# Patient Record
Sex: MALE | Race: WHITE | Employment: OTHER | RURAL
[De-identification: names, ages, dates, MRNs, and addresses within clinical notes are randomized per-mention and may not be internally consistent; named-entity substitution may affect disease eponyms.]

---

## 2017-01-01 ENCOUNTER — OFFICE VISIT (OUTPATIENT)
Dept: FAMILY MEDICINE CLINIC | Age: 82
End: 2017-01-01

## 2017-01-01 VITALS
BODY MASS INDEX: 20.9 KG/M2 | WEIGHT: 146 LBS | TEMPERATURE: 98 F | SYSTOLIC BLOOD PRESSURE: 112 MMHG | OXYGEN SATURATION: 98 % | DIASTOLIC BLOOD PRESSURE: 67 MMHG | HEART RATE: 71 BPM | RESPIRATION RATE: 16 BRPM | HEIGHT: 70 IN

## 2017-01-01 DIAGNOSIS — E55.9 HYPOVITAMINOSIS D: ICD-10-CM

## 2017-01-01 DIAGNOSIS — E78.2 MIXED HYPERLIPIDEMIA: Chronic | ICD-10-CM

## 2017-01-01 DIAGNOSIS — E11.9 CONTROLLED TYPE 2 DIABETES MELLITUS WITHOUT COMPLICATION, WITHOUT LONG-TERM CURRENT USE OF INSULIN (HCC): Chronic | ICD-10-CM

## 2017-01-01 DIAGNOSIS — E83.42 HYPOMAGNESEMIA: ICD-10-CM

## 2017-01-01 DIAGNOSIS — I25.10 CORONARY ARTERY DISEASE INVOLVING NATIVE CORONARY ARTERY OF NATIVE HEART WITHOUT ANGINA PECTORIS: ICD-10-CM

## 2017-01-01 DIAGNOSIS — J00 ACUTE NASOPHARYNGITIS: Primary | ICD-10-CM

## 2017-01-01 RX ORDER — SIMVASTATIN 40 MG/1
TABLET, FILM COATED ORAL
Qty: 90 TAB | Refills: 1 | Status: SHIPPED | OUTPATIENT
Start: 2017-01-01 | End: 2017-01-01 | Stop reason: SDUPTHER

## 2017-01-01 RX ORDER — CALCIUM CARB/VITAMIN D3/VIT K1 500-500-40
1 TABLET,CHEWABLE ORAL DAILY
Qty: 90 CAP | Refills: 1 | Status: SHIPPED | OUTPATIENT
Start: 2017-01-01

## 2017-01-01 RX ORDER — PREDNISONE 20 MG/1
TABLET ORAL
Qty: 30 TAB | Refills: 0 | Status: SHIPPED | OUTPATIENT
Start: 2017-01-01 | End: 2018-01-01 | Stop reason: ALTCHOICE

## 2017-01-01 RX ORDER — IPRATROPIUM BROMIDE 42 UG/1
1 SPRAY, METERED NASAL 4 TIMES DAILY
Qty: 15 ML | Refills: 0 | Status: SHIPPED | OUTPATIENT
Start: 2017-01-01 | End: 2018-01-01 | Stop reason: ALTCHOICE

## 2017-01-01 RX ORDER — METFORMIN HYDROCHLORIDE 500 MG/1
500 TABLET ORAL 2 TIMES DAILY WITH MEALS
Qty: 180 TAB | Refills: 1 | Status: SHIPPED | OUTPATIENT
Start: 2017-01-01 | End: 2017-01-01 | Stop reason: SDUPTHER

## 2017-01-01 RX ORDER — CALCIUM CARB/VITAMIN D3/VIT K1 500-500-40
1 TABLET,CHEWABLE ORAL DAILY
Qty: 90 CAP | Refills: 1 | Status: SHIPPED | OUTPATIENT
Start: 2017-01-01 | End: 2017-01-01 | Stop reason: SDUPTHER

## 2017-01-01 RX ORDER — ZINC GLUCONATE 10 MG
250 LOZENGE ORAL DAILY
Qty: 90 TAB | Refills: 1 | Status: SHIPPED | OUTPATIENT
Start: 2017-01-01

## 2017-01-01 RX ORDER — METFORMIN HYDROCHLORIDE 500 MG/1
500 TABLET ORAL 2 TIMES DAILY WITH MEALS
Qty: 180 TAB | Refills: 1 | Status: SHIPPED | OUTPATIENT
Start: 2017-01-01 | End: 2018-01-01 | Stop reason: SDUPTHER

## 2017-01-01 RX ORDER — SIMVASTATIN 40 MG/1
TABLET, FILM COATED ORAL
Qty: 90 TAB | Refills: 1 | Status: SHIPPED | OUTPATIENT
Start: 2017-01-01 | End: 2018-01-01 | Stop reason: SDUPTHER

## 2017-01-01 RX ORDER — ZINC GLUCONATE 10 MG
250 LOZENGE ORAL DAILY
Qty: 90 TAB | Refills: 1 | Status: SHIPPED | OUTPATIENT
Start: 2017-01-01 | End: 2017-01-01 | Stop reason: SDUPTHER

## 2017-02-03 ENCOUNTER — OFFICE VISIT (OUTPATIENT)
Dept: FAMILY MEDICINE CLINIC | Age: 82
End: 2017-02-03

## 2017-02-03 VITALS
TEMPERATURE: 97.8 F | RESPIRATION RATE: 18 BRPM | HEIGHT: 72 IN | WEIGHT: 154.2 LBS | BODY MASS INDEX: 20.88 KG/M2 | SYSTOLIC BLOOD PRESSURE: 117 MMHG | HEART RATE: 73 BPM | OXYGEN SATURATION: 96 % | DIASTOLIC BLOOD PRESSURE: 85 MMHG

## 2017-02-03 DIAGNOSIS — E55.9 HYPOVITAMINOSIS D: ICD-10-CM

## 2017-02-03 DIAGNOSIS — Z95.1 S/P CABG X 5: ICD-10-CM

## 2017-02-03 DIAGNOSIS — M16.0 PRIMARY OSTEOARTHRITIS OF BOTH HIPS: Chronic | ICD-10-CM

## 2017-02-03 DIAGNOSIS — E83.42 HYPOMAGNESEMIA: ICD-10-CM

## 2017-02-03 DIAGNOSIS — Z13.39 SCREENING FOR ALCOHOLISM: ICD-10-CM

## 2017-02-03 DIAGNOSIS — E78.2 MIXED HYPERLIPIDEMIA: Chronic | ICD-10-CM

## 2017-02-03 DIAGNOSIS — I25.10 CORONARY ARTERY DISEASE INVOLVING NATIVE CORONARY ARTERY OF NATIVE HEART WITHOUT ANGINA PECTORIS: ICD-10-CM

## 2017-02-03 DIAGNOSIS — E11.9 CONTROLLED TYPE 2 DIABETES MELLITUS WITHOUT COMPLICATION, WITHOUT LONG-TERM CURRENT USE OF INSULIN (HCC): Chronic | ICD-10-CM

## 2017-02-03 DIAGNOSIS — Z00.00 ROUTINE GENERAL MEDICAL EXAMINATION AT A HEALTH CARE FACILITY: Primary | ICD-10-CM

## 2017-02-03 RX ORDER — CALCIUM CARB/VITAMIN D3/VIT K1 500-500-40
1 TABLET,CHEWABLE ORAL DAILY
Qty: 90 CAP | Refills: 1 | Status: SHIPPED | OUTPATIENT
Start: 2017-02-03 | End: 2017-06-13 | Stop reason: SDUPTHER

## 2017-02-03 RX ORDER — SIMVASTATIN 40 MG/1
TABLET, FILM COATED ORAL
Qty: 90 TAB | Refills: 1 | Status: SHIPPED | OUTPATIENT
Start: 2017-02-03 | End: 2017-06-13 | Stop reason: SDUPTHER

## 2017-02-03 RX ORDER — ZINC GLUCONATE 10 MG
250 LOZENGE ORAL DAILY
Qty: 90 TAB | Refills: 1 | Status: SHIPPED | OUTPATIENT
Start: 2017-02-03 | End: 2017-01-01 | Stop reason: SDUPTHER

## 2017-02-03 NOTE — PROGRESS NOTES
This is an Initial Medicare Annual Wellness Exam (AWV) (Performed 12 months after IPPE or effective date of Medicare Part B enrollment, Once in a lifetime)    I have reviewed the patient's medical history in detail and updated the computerized patient record. History   The patient comes in today to be evaluated for his diabetes which has been well controlled and he is having no polyuria or polyphagia or polydipsia or abnormal weight loss. The patient has actinic keratoses for which she is seen by dermatologist.  He does have dry skin which is controlled nicely with Lac-Hydrin 12%. The patient had osteoarthritis of his hips but has had hip replacements in the distant past and currently has no pain there. His hyperlipidemia is being treated with simvastatin without any muscle pain or weakness and his hypomagnesemia is being replaced by daily supplements as well as his hypovitaminosis D being treated by vitamin D supplementation with weakness of bone pain. Past Medical History   Diagnosis Date    Arthritis      hips    CAD (coronary artery disease) 2008     quintuple bypass-Savage    Calculus of kidney     Cancer (HCC)      skin cancers (removed)    Diabetes ([facility] [address] 1990's     Type II    GERD (gastroesophageal reflux disease)     Hypercholesterolemia     Other ill-defined conditions(799.89)      elevated cholesterol      Past Surgical History   Procedure Laterality Date    Pr cardiac surg procedure unlist  2008     quintuple bypass-Wofgang    Hx urological  1965     kidney stone extraction    Hx orthopaedic  1996 & 2009     hip replacement- bilateral    Hx cholecystectomy      Colonoscopy       Current Outpatient Prescriptions   Medication Sig Dispense Refill    simvastatin (ZOCOR) 40 mg tablet TAKE 1 TABLET EVERY NIGHT 90 Tab 1    cholecalciferol, vitamin d3, (VITAMIN D3) 400 unit cap Take 400 Units by mouth daily. 90 Cap 1    magnesium 250 mg tab Take 1 Tab by mouth daily.  90 Tab 1    metFORMIN (GLUCOPHAGE) 500 mg tablet Take 1 Tab by mouth two (2) times daily (with meals). 180 Tab 1    OTHER       Lancets misc Use to check blood sugars on monitor 1 Package 2    calcium 500 mg Tab Take  by mouth.  ammonium lactate (LAC-HYDRIN) 12 % lotion Apply 5 g to affected area two (2) times a day for 90 days. rub in to affected area well 450 g 3     Allergies   Allergen Reactions    Aspirin Other (comments)     \"makes stomach raw\"     Family History   Problem Relation Age of Onset    Diabetes Mother     Heart Disease Father     Heart Disease Brother      Social History   Substance Use Topics    Smoking status: Former Smoker    Smokeless tobacco: Never Used      Comment: quit smoking 1954    Alcohol use No     Patient Active Problem List   Diagnosis Code    Diabetes mellitus type 2, controlled ([facility] [address] E11.9    Osteoarthritis of hip M16.9    Hyperlipidemia E78.5    CAD (coronary artery disease) I25.10    SCC (squamous cell carcinoma) C44.92    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    History of cholecystectomy Z90.49    Actinic keratoses L57.0    Dry skin dermatitis L85.3    Controlled type 2 diabetes mellitus without complication, without long-term current use of insulin (HCC) E11.9    Hypomagnesemia E83.42    Hypovitaminosis D E55.9         Depression Risk Factor Screening:     PHQ 2 / 9, over the last two weeks 9/1/2015   Little interest or pleasure in doing things Not at all   Feeling down, depressed or hopeless Not at all   Total Score PHQ 2 0     Alcohol Risk Factor Screening: On any occasion during the past 3 months, have you had more than 4 drinks containing alcohol? No    Do you average more than 14 drinks per week? No    Functional Ability and Level of Safety:     Functional Ability:   Does the patient exhibit a steady gait? yes   How long did it take the patient to get up and walk from a sitting position?  3 seconds   Is the patient self reliant?  (ie can do own laundry, meals, household chores)  yes     Does the patient handle his/her own medications? yes     Does the patient handle his/her own money? yes     Is the patients home safe (ie good lighting, handrails on stairs and bath, etc.)? yes     Did you notice or did patient express any hearing difficulties? yes     Did you notice or did patient express any vision difficulties?   no     Were distance and reading eye charts used? no       Advance Care Planning:   Patient was offered the opportunity to discuss advance care planning:  yes     Does patient have an Advance Directive:  no   If no, did you provide information on Caring Connections? No pt states daughter is going to take care of getting this. ADL Assessment 2/3/2017   Feeding yourself No Help Needed   Getting from bed to chair No Help Needed   Getting dressed No Help Needed   Bathing or showering No Help Needed   Walk across the room (includes cane/walker) No Help Needed   Using the telphone No Help Needed   Taking your medications No Help Needed   Preparing meals No Help Needed   Managing money (expenses/bills) No Help Needed   Moderately strenuous housework (laundry) No Help Needed   Shopping for personal items (toiletries/medicines) No Help Needed   Shopping for groceries No Help Needed   Driving No Help Needed   Climbing a flight of stairs No Help Needed   Getting to places beyond walking distances No Help Needed           Hearing Loss   borderline normal-to-mild    Activities of Daily Living   Self-care. Requires assistance with: no ADLs    Fall Risk     Fall Risk Assessment, last 12 mths 2/3/2017   Able to walk? Yes   Fall in past 12 months? No   Number of falls in past 12 months -     Abuse Screen   Patient is not abused    Review of Systems   A comprehensive review of systems was negative except for that written in the HPI.     Physical Examination     No exam data present    Evaluation of Cognitive Function:  Mood/affect:  happy  Appearance: age appropriate  Family member/caregiver input: NA    Clock exam given and passed. Visit Vitals    /85 (BP 1 Location: Left arm, BP Patient Position: Sitting)    Pulse 73    Temp 97.8 °F (36.6 °C) (Oral)    Resp 18    Ht 6' (1.829 m)    Wt 154 lb 3.2 oz (69.9 kg)    SpO2 96%    BMI 20.91 kg/m2     General:  Alert, cooperative, no distress, appears stated age. Head:  Normocephalic, without obvious abnormality, atraumatic. Eyes:  Conjunctivae/corneas clear. PERRL, EOMs intact. Fundi benign   Ears:  Normal TMs and external ear canals both ears. Nose: Nares normal. Septum midline. Mucosa normal. No drainage or sinus tenderness. Throat: Lips, mucosa, and tongue normal. Teeth and gums normal.   Neck: Supple, symmetrical, trachea midline, no adenopathy, thyroid: no enlargement/tenderness/nodules, no carotid bruit and no JVD. Back:   Symmetric, no curvature. ROM normal. No CVA tenderness. Lungs:   Clear to auscultation bilaterally. Chest wall:  No tenderness or deformity. Patient has a CABG scar. Heart:  Regular rate and rhythm, S1, S2 normal, no murmur, click, rub or gallop. Abdomen:   Soft, non-tender. Bowel sounds normal. No masses,  No organomegaly. Extremities: Extremities normal, atraumatic, no cyanosis or edema. Pulses: 2+ and symmetric all extremities. Skin: Skin color, texture, turgor normal. No rashes or lesions   Lymph nodes: Cervical, supraclavicular, and axillary nodes normal.   Neurologic: CNII-XII intact. Normal strength, sensation and reflexes throughout.    Diabetic foot examPt with wnl DM foot exam including neuro and circ w/o skin nor orthopedic deformities    Patient Care Team:  William Yusuf MD as PCP - General (Family Practice)    Advice/Referrals/Counseling   Education and counseling provided:  Are appropriate based on today's review and evaluation  End-of-Life planning (with patient's consent)  Pneumococcal Vaccine  Influenza Vaccine  Diabetes screening test    Assessment/Plan       ICD-10-CM ICD-9-CM    1. Controlled type 2 diabetes mellitus without complication, without long-term current use of insulin (HCC) E11.9 250.00  DIABETES FOOT EXAM      HEMOGLOBIN A1C WITH EAG      MD COLLECTION VENOUS BLOOD,VENIPUNCTURE      MICROALBUMIN, UR, RAND      LIPID PANEL      METABOLIC PANEL, COMPREHENSIVE      simvastatin (ZOCOR) 40 mg tablet   2. Primary osteoarthritis of both hips X85.8 655.47 METABOLIC PANEL, COMPREHENSIVE   3. Mixed hyperlipidemia E78.2 272.2 LIPID PANEL      METABOLIC PANEL, COMPREHENSIVE      simvastatin (ZOCOR) 40 mg tablet   4. Coronary artery disease involving native coronary artery of native heart without angina pectoris I25.10 414.01 LIPID PANEL      METABOLIC PANEL, COMPREHENSIVE      simvastatin (ZOCOR) 40 mg tablet   5. 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 Hypomagnesemia E83.42 275.2 magnesium 250 mg tab   7. Hypovitaminosis D E55.9 268.9 cholecalciferol, vitamin d3, (VITAMIN D3) 400 unit cap   8. Routine general medical examination at a health care facility Z00.00 V70.0    9. Screening for alcoholism Z13.89 V79.1    .

## 2017-02-03 NOTE — MR AVS SNAPSHOT
Visit Information Date & Time Provider Department Dept. Phone Encounter #  
 2/3/2017  1:20 PM Deepali High MD [facility] [phone] [id number] Upcoming Health Maintenance Date Due  
 FOOT EXAM Q1 10/31/1943 EYE EXAM RETINAL OR DILATED Q1 10[birth year]31/1943 ZOSTER VACCINE AGE 60> 10/31/1993 GLAUCOMA SCREENING Q2Y 10/31/1998 HEMOGLOBIN A1C Q6M 6/11/2016 MICROALBUMIN Q1 9/1/2016 LIPID PANEL Q1 9/1/2016 Pneumococcal 65+ Low/Medium Risk (2 of 2 - PPSV23) 2/3/2018 MEDICARE YEARLY EXAM 2/4/2018 DTaP/Tdap/Td series (2 - Td) 2/3/2027 Allergies as of 2/3/2017  Review Complete On: 2/3/2017 By: Kate Mclain LPN Severity Noted Reaction Type Reactions Aspirin Medium 01/05/2012   Side Effect Other (comments) \"makes stomach raw\" Current Immunizations  Never Reviewed No immunizations on file. Not reviewed this visit You Were Diagnosed With   
  
 Codes Comments Controlled type 2 diabetes mellitus without complication, without long-term current use of insulin ([facility] [address]    -  Primary ICD-10-CM: E11.9 ICD-9-CM: 250.00 Primary osteoarthritis of both hips     ICD-10-CM: M16.0 ICD-9-CM: 715.15 Mixed hyperlipidemia     ICD-10-CM: E78.2 ICD-9-CM: 272.2 Coronary artery disease involving native coronary artery of native heart without angina pectoris     ICD-10-CM: I25.10 ICD-9-CM: 414.01 S/P CABG x 5     ICD-10-CM: Z95.1 ICD-9-CM: V45.81 Hypomagnesemia     ICD-10-CM: B09.37 
ICD-9-CM: 275.2 Hypovitaminosis D     ICD-10-CM: E55.9 ICD-9-CM: 268.9 Routine general medical examination at a health care facility     ICD-10-CM: Z00.00 ICD-9-CM: V70.0 Screening for alcoholism     ICD-10-CM: Z13.89 ICD-9-CM: V79.1 Vitals BP Pulse Temp Resp Height(growth percentile) Weight(growth percentile)  117/85 (BP 1 Location: Left arm, BP Patient Position: Sitting) 73 97.8 °F (36.6 °C) (Oral) 18 6' (1.829 m) 154 lb 3.2 oz (69.9 kg) SpO2 BMI Smoking Status 96% 20.91 kg/m2 Former Smoker Vitals History BMI and BSA Data Body Mass Index Body Surface Area  
 20.91 kg/m 2 1.88 m 2 Preferred Pharmacy Pharmacy Name Phone [facility] [place] [facility] [address] [phone] Your Updated Medication List  
  
   
This list is accurate as of: 2/3/17  2:26 PM.  Always use your most recent med list.  
  
  
  
  
 ammonium lactate 12 % lotion Commonly known as:  LAC-HYDRIN Apply 5 g to affected area two (2) times a day for 90 days. rub in to affected area well  
  
 calcium 500 mg Tab Take  by mouth. cholecalciferol (vitamin d3) 400 unit Cap Commonly known as:  VITAMIN D3 Take 400 Units by mouth daily. Lancets Misc Use to check blood sugars on monitor  
  
 magnesium 250 mg Tab Take 1 Tab by mouth daily. metFORMIN 500 mg tablet Commonly known as:  GLUCOPHAGE Take 1 Tab by mouth two (2) times daily (with meals). OTHER  
  
 simvastatin 40 mg tablet Commonly known as:  ZOCOR  
TAKE 1 TABLET EVERY NIGHT  
  
 varicella zoster vacine live 19,400 unit/0.65 mL Susr injection Commonly known as:  varicella-zoster vacine live 1 Vial by SubCUTAneous route once for 1 dose. Prescriptions Printed Refills  
 varicella zoster vacine live (VARICELLA-ZOSTER VACINE LIVE) 19,400 unit/0.65 mL susr injection 0 Si Vial by SubCUTAneous route once for 1 dose. Class: Print Route: SubCUTAneous Prescriptions Sent to Pharmacy Refills  
 simvastatin (ZOCOR) 40 mg tablet 1 Sig: TAKE 1 TABLET EVERY NIGHT Class: Normal  
 Pharmacy: [address] [place] [address], [address] Ph #: [phone]  
 cholecalciferol, vitamin d3, (VITAMIN D3) 400 unit cap 1 Sig: Take 400 Units by mouth daily.   
 Class: Normal  
 Pharmacy: [address] [place] [address], [address] [place] [address] Ph #: [phone] Route: Oral  
 magnesium 250 mg tab 1 Sig: Take 1 Tab by mouth daily. Class: Normal  
 Pharmacy: [address] [place] [address], [address] [place] [address] Ph #: [phone] Route: Oral  
  
We Performed the Following HEMOGLOBIN A1C WITH EAG [31053 CPT(R)] HM DIABETES FOOT EXAM [HM7 Custom] LIPID PANEL [58695 CPT(R)] METABOLIC PANEL, COMPREHENSIVE [46740 CPT(R)] MICROALBUMIN, UR, RAND [id number] CPT(R)] DC COLLECTION VENOUS BLOOD,VENIPUNCTURE I2124636 CPT(R)] Introducing [facility] & HEALTH SERVICES! Radha Rodríguez introduces [employer] patient portal. Now you can access parts of your medical record, email your doctor's office, and request medication refills online. 1. In your internet browser, go to [email or web address] 2. Click on the First Time User? Click Here link in the Sign In box. You will see the New Member Sign Up page. 3. Enter your [employer] Step Access Code exactly as it appears below. You will not need to use this code after youve completed the sign-up process. If you do not sign up before the expiration date, you must request a new code. · [employer] Access Code: [email or web address] Expires: 5/4/2017  2:26 PM 
 
4. Enter the last four digits of your Social Security Number (xxxx) and Date of Birth (mm/dd/yyyy) as indicated and click Submit. You will be taken to the next sign-up page. 5. Create a [employer] ID. This will be your [employer] Step login ID and cannot be changed, so think of one that is secure and easy to remember. 6. Create a [employer] Step password. You can change your password at any time. 7. Enter your Password Reset Question and Answer. This can be used at a later time if you forget your password. 8. Enter your e-mail address. You will receive e-mail notification when new information is available in [address] 9. Click Sign Up. You can now view and download portions of your medical record. 10. Click the Download Summary menu link to download a portable copy of your medical information. If you have questions, please visit the Frequently Asked Questions section of the [employer] website. Remember, [employer] is NOT to be used for urgent needs. For medical emergencies, dial 911. Now available from your iPhone and Android! Please provide this summary of care documentation to your next provider. Your primary care clinician is listed as Rosa Davis. If you have any questions after today's visit, please call [phone].

## 2017-02-04 LAB
ALBUMIN SERPL-MCNC: 4.1 G/DL (ref 3.5–4.7)
ALBUMIN/GLOB SERPL: 1.5 {RATIO} (ref 1.1–2.5)
ALP SERPL-CCNC: 65 IU/L (ref 39–117)
ALT SERPL-CCNC: 13 IU/L (ref 0–44)
AST SERPL-CCNC: 15 IU/L (ref 0–40)
BILIRUB SERPL-MCNC: 0.5 MG/DL (ref 0–1.2)
BUN SERPL-MCNC: 22 MG/DL (ref 8–27)
BUN/CREAT SERPL: 17 (ref 10–22)
CALCIUM SERPL-MCNC: 9.5 MG/DL (ref 8.6–10.2)
CHLORIDE SERPL-SCNC: 103 MMOL/L (ref 96–106)
CHOLEST SERPL-MCNC: 133 MG/DL (ref 100–199)
CO2 SERPL-SCNC: 22 MMOL/L (ref 18–29)
CREAT SERPL-MCNC: 1.32 MG/DL (ref 0.76–1.27)
EST. AVERAGE GLUCOSE BLD GHB EST-MCNC: 134 MG/DL
GLOBULIN SER CALC-MCNC: 2.8 G/DL (ref 1.5–4.5)
GLUCOSE SERPL-MCNC: 190 MG/DL (ref 65–99)
HBA1C MFR BLD: 6.3 % (ref 4.8–5.6)
HDLC SERPL-MCNC: 45 MG/DL
INTERPRETATION, 910389: NORMAL
INTERPRETATION: NORMAL
LDLC SERPL CALC-MCNC: 68 MG/DL (ref 0–99)
MICROALBUMIN UR-MCNC: 4.1 UG/ML
PDF IMAGE, 910387: NORMAL
POTASSIUM SERPL-SCNC: 4.8 MMOL/L (ref 3.5–5.2)
PROT SERPL-MCNC: 6.9 G/DL (ref 6–8.5)
SODIUM SERPL-SCNC: 141 MMOL/L (ref 134–144)
TRIGL SERPL-MCNC: 99 MG/DL (ref 0–149)
VLDLC SERPL CALC-MCNC: 20 MG/DL (ref 5–40)

## 2017-05-10 ENCOUNTER — OFFICE VISIT (OUTPATIENT)
Dept: FAMILY MEDICINE CLINIC | Age: 82
End: 2017-05-10

## 2017-05-10 DIAGNOSIS — S01.81XD FACIAL LACERATION, SUBSEQUENT ENCOUNTER: Primary | ICD-10-CM

## 2017-05-10 NOTE — PROGRESS NOTES
Erika Bansal is a 80 y.o. male who presents with the following:  Chief Complaint   Patient presents with    Skin Problem       Skin Problem   The history is provided by the patient (Patient with a 4 cm laceration to the lateral aspect on the left of his chin which originally had 13 sutures but now has only 9). Pertinent negatives include no chest pain, no abdominal pain, no headaches and no shortness of breath. Allergies   Allergen Reactions    Aspirin Other (comments)     \"makes stomach raw\"       Current Outpatient Prescriptions   Medication Sig    simvastatin (ZOCOR) 40 mg tablet TAKE 1 TABLET EVERY NIGHT    cholecalciferol, vitamin d3, (VITAMIN D3) 400 unit cap Take 400 Units by mouth daily.  magnesium 250 mg tab Take 1 Tab by mouth daily.  metFORMIN (GLUCOPHAGE) 500 mg tablet Take 1 Tab by mouth two (2) times daily (with meals).  ammonium lactate (LAC-HYDRIN) 12 % lotion Apply 5 g to affected area two (2) times a day for 90 days. rub in to affected area well    OTHER     Lancets misc Use to check blood sugars on monitor    calcium 500 mg Tab Take  by mouth. No current facility-administered medications for this visit.         Past Medical History:   Diagnosis Date    Arthritis     hips    CAD (coronary artery disease) 2008    quintuple bypass-Savage    Calculus of kidney     Cancer ([address]     skin cancers (removed)    Diabetes ([address] 1990's    Type II    GERD (gastroesophageal reflux disease)     Hypercholesterolemia     Other ill-defined conditions     elevated cholesterol       Past Surgical History:   Procedure Laterality Date    CARDIAC SURG PROCEDURE UNLIST  2008    quintuple bypass-Wofgang    COLONOSCOPY      HX CHOLECYSTECTOMY      HX ORTHOPAEDIC  1996 & 2009    hip replacement- bilateral    HX UROLOGICAL  1965    kidney stone extraction       Family History   Problem Relation Age of Onset    Diabetes Mother     Heart Disease Father     Heart Disease Brother Social History     Social History    Marital status:      Spouse name: N/A    Number of children: N/A    Years of education: N/A     Social History Main Topics    Smoking status: Former Smoker    Smokeless tobacco: Never Used      Comment: quit smoking 1954    Alcohol use No    Drug use: No    Sexual activity: Not on file     Other Topics Concern    Not on file     Social History Narrative       Review of Systems   Respiratory: Negative for shortness of breath. Cardiovascular: Negative for chest pain. Gastrointestinal: Negative for abdominal pain. Neurological: Negative for headaches. There were no vitals taken for this visit. Physical Exam   Constitutional: He is oriented to person, place, and time and well-developed, well-nourished, and in no distress. HENT:   Head: Normocephalic and atraumatic. Nose: Nose normal.   Mouth/Throat: Oropharynx is clear and moist.   Eyes: Conjunctivae and EOM are normal. Pupils are equal, round, and reactive to light. Neck: Normal range of motion. Neck supple. No JVD present. No tracheal deviation present. No thyromegaly present. Cardiovascular: Normal rate, regular rhythm, normal heart sounds and intact distal pulses. Exam reveals no gallop and no friction rub. No murmur heard. Pulmonary/Chest: Effort normal and breath sounds normal. No respiratory distress. He has no wheezes. He has no rales. He exhibits no tenderness. Abdominal: Soft. Bowel sounds are normal. He exhibits no distension and no mass. There is no tenderness. There is no rebound and no guarding. Musculoskeletal: Normal range of motion. He exhibits no edema or tenderness. Lymphadenopathy:     He has no cervical adenopathy. Neurological: He is alert and oriented to person, place, and time. He has normal reflexes. No cranial nerve deficit. He exhibits normal muscle tone. Gait normal. Coordination normal.   Skin: Skin is warm and dry. No rash noted. No erythema. Laceration to the left side of the chin that has been sutured and appears fairly well-healed. The 9 remaining sutures are removed with suture scissors and tweezers and the patient is warned to be careful not to put any excessive traction on the skin as it will pop open. Psychiatric: Mood, memory, affect and judgment normal.   Vitals reviewed. ICD-10-CM ICD-9-CM    1.  Facial laceration, subsequent encounter S01.81XD V58.89 REMOVAL OF SUTURES     873.40        Orders Placed This Encounter    REMOVAL OF SUTURES    9 sutures are removed- the patient thinks that the remaining sutures came out on their own    Follow-up Disposition: Not on Joe Mueller MD

## 2017-05-10 NOTE — MR AVS SNAPSHOT
Visit Information Date & Time Provider Department Dept. Phone Encounter #  
 5/10/2017 11:00 AM Alondra Funez [facility] [phone] [id number] Upcoming Health Maintenance Date Due  
 EYE EXAM RETINAL OR DILATED Q1 10/31/1943 ZOSTER VACCINE AGE 60> 10/31/1993 GLAUCOMA SCREENING Q2Y 10/31/1998 INFLUENZA AGE 9 TO ADULT 8/1/2017 HEMOGLOBIN A1C Q6M 8/3/2017 Pneumococcal 65+ Low/Medium Risk (2 of 2 - PPSV23) 2/3/2018 FOOT EXAM Q1 2/3/2018 MICROALBUMIN Q1 2/3/2018 LIPID PANEL Q1 2/3/2018 MEDICARE YEARLY EXAM 2/4/2018 DTaP/Tdap/Td series (2 - Td) 2/3/2027 Allergies as of 5/10/2017  Review Complete On: 5/1/2017 By: Tanika Dumont RN Severity Noted Reaction Type Reactions Aspirin Medium 01/05/2012   Side Effect Other (comments) \"makes stomach raw\" Current Immunizations  Never Reviewed No immunizations on file. Not reviewed this visit Vitals Smoking Status Former Smoker Your Updated Medication List  
  
   
This list is accurate as of: 5/10/17 11:55 AM.  Always use your most recent med list.  
  
  
  
  
 ammonium lactate 12 % lotion Commonly known as:  LAC-HYDRIN Apply 5 g to affected area two (2) times a day for 90 days. rub in to affected area well  
  
 calcium 500 mg Tab Take  by mouth. cholecalciferol (vitamin d3) 400 unit Cap Commonly known as:  VITAMIN D3 Take 400 Units by mouth daily. Lancets Misc Use to check blood sugars on monitor  
  
 magnesium 250 mg Tab Take 1 Tab by mouth daily. metFORMIN 500 mg tablet Commonly known as:  GLUCOPHAGE Take 1 Tab by mouth two (2) times daily (with meals). OTHER  
  
 simvastatin 40 mg tablet Commonly known as:  ZOCOR  
TAKE 1 TABLET EVERY NIGHT Introducing [facility] & [facility]!    
 [facility] introduces [employer] patient portal. Now you can access parts of your medical record, email your doctor's office, and request medication refills online. 1. In your internet browser, go to [email or web address] 2. Click on the First Time User? Click Here link in the Sign In box. You will see the New Member Sign Up page. 3. Enter your [employer] Access Code exactly as it appears below. You will not need to use this code after youve completed the sign-up process. If you do not sign up before the expiration date, you must request a new code. · In[employer] Resource Access Code: [email or web address] Expires: 8/8/2017 11:55 AM 
 
4. Enter the last four digits of your Social Security Number (xxxx) and Date of Birth (mm/dd/yyyy) as indicated and click Submit. You will be taken to the next sign-up page. 5. Create a In[employer] Resource ID. This will be your [employer] login ID and cannot be changed, so think of one that is secure and easy to remember. 6. Create a [employer] password. You can change your password at any time. 7. Enter your Password Reset Question and Answer. This can be used at a later time if you forget your password. 8. Enter your e-mail address. You will receive e-mail notification when new information is available in [address] 9. Click Sign Up. You can now view and download portions of your medical record. 10. Click the Download Summary menu link to download a portable copy of your medical information. If you have questions, please visit the Frequently Asked Questions section of the In[employer] Resource website. Remember, In[employer] Resource is NOT to be used for urgent needs. For medical emergencies, dial 911. Now available from your iPhone and Android! Please provide this summary of care documentation to your next provider. Your primary care clinician is listed as Prince Tate. If you have any questions after today's visit, please call [phone].

## 2017-05-22 ENCOUNTER — OFFICE VISIT (OUTPATIENT)
Dept: FAMILY MEDICINE CLINIC | Age: 82
End: 2017-05-22

## 2017-05-22 VITALS
WEIGHT: 151.4 LBS | TEMPERATURE: 98 F | BODY MASS INDEX: 21.67 KG/M2 | HEIGHT: 70 IN | DIASTOLIC BLOOD PRESSURE: 66 MMHG | SYSTOLIC BLOOD PRESSURE: 111 MMHG | RESPIRATION RATE: 18 BRPM | HEART RATE: 77 BPM | OXYGEN SATURATION: 98 %

## 2017-05-22 DIAGNOSIS — Z95.1 S/P CABG X 5: ICD-10-CM

## 2017-05-22 DIAGNOSIS — I25.10 CORONARY ARTERY DISEASE INVOLVING NATIVE CORONARY ARTERY OF NATIVE HEART WITHOUT ANGINA PECTORIS: ICD-10-CM

## 2017-05-22 DIAGNOSIS — Z02.4 ENCOUNTER FOR CDL (COMMERCIAL DRIVING LICENSE) EXAM: Primary | ICD-10-CM

## 2017-05-22 LAB
BILIRUB UR QL STRIP: NEGATIVE
GLUCOSE UR-MCNC: NEGATIVE MG/DL
KETONES P FAST UR STRIP-MCNC: NEGATIVE MG/DL
PH UR STRIP: 7 [PH] (ref 4.6–8)
PROT UR QL STRIP: NEGATIVE MG/DL
SP GR UR STRIP: 1.02 (ref 1–1.03)
UA UROBILINOGEN AMB POC: NORMAL (ref 0.2–1)
URINALYSIS CLARITY POC: CLEAR
URINALYSIS COLOR POC: NORMAL
URINE BLOOD POC: NEGATIVE
URINE LEUKOCYTES POC: NEGATIVE
URINE NITRITES POC: NEGATIVE

## 2017-05-22 NOTE — PROGRESS NOTES
Cynthia Samuels is a 80 y.o. 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 he reports feeling well today with no complaints. *See scanned media for full history and physical exam.    Visit Vitals    /66 (BP 1 Location: Right arm, BP Patient Position: Sitting)    Pulse 77    Temp 98 °F (36.7 °C) (Temporal)    Resp 18    Ht 5' 10\" (1.778 m)    Wt 151 lb 6.4 oz (68.7 kg)    SpO2 98%    BMI 21.72 kg/m2       Results for orders placed or performed in visit on 05/22/17   AMB POC URINALYSIS DIP STICK AUTO W/O MICRO   Result Value Ref Range    Color (UA POC) Dark Yellow     Clarity (UA POC) Clear     Glucose (UA POC) Negative Negative    Bilirubin (UA POC) Negative Negative    Ketones (UA POC) Negative Negative    Specific gravity (UA POC) 1.025 1.001 - 1.035    Blood (UA POC) Negative Negative    pH (UA POC) 7.0 4.6 - 8.0    Protein (UA POC) Negative Negative mg/dL    Urobilinogen (UA POC) 0.2 mg/dL 0.2 - 1    Nitrites (UA POC) Negative Negative    Leukocyte esterase (UA POC) Negative Negative       Holding DOT forms. Pt needs glasses updated, does not pass with vision today. 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 Will try to order for him.        Robyn David PA-C

## 2017-05-23 ENCOUNTER — TELEPHONE (OUTPATIENT)
Dept: FAMILY MEDICINE CLINIC | Age: 82
End: 2017-05-23

## 2017-05-23 NOTE — TELEPHONE ENCOUNTER
Pt calls to ask if his 2-D echo could be scheduled sooner than  June. 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 Please call at [phone].

## 2017-05-23 NOTE — TELEPHONE ENCOUNTER
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

## 2017-06-13 ENCOUNTER — TELEPHONE (OUTPATIENT)
Dept: FAMILY MEDICINE CLINIC | Age: 82
End: 2017-06-13

## 2017-06-13 DIAGNOSIS — E78.2 MIXED HYPERLIPIDEMIA: Chronic | ICD-10-CM

## 2017-06-13 DIAGNOSIS — I25.10 CORONARY ARTERY DISEASE INVOLVING NATIVE CORONARY ARTERY OF NATIVE HEART WITHOUT ANGINA PECTORIS: ICD-10-CM

## 2017-06-13 DIAGNOSIS — E11.9 CONTROLLED TYPE 2 DIABETES MELLITUS WITHOUT COMPLICATION, WITHOUT LONG-TERM CURRENT USE OF INSULIN (HCC): Chronic | ICD-10-CM

## 2017-06-13 DIAGNOSIS — E55.9 HYPOVITAMINOSIS D: ICD-10-CM

## 2017-06-13 RX ORDER — SIMVASTATIN 40 MG/1
TABLET, FILM COATED ORAL
Qty: 90 TAB | Refills: 1 | Status: SHIPPED | OUTPATIENT
Start: 2017-06-13 | End: 2017-01-01 | Stop reason: SDUPTHER

## 2017-06-13 RX ORDER — CALCIUM CARB/VITAMIN D3/VIT K1 500-500-40
1 TABLET,CHEWABLE ORAL DAILY
Qty: 90 CAP | Refills: 1 | Status: SHIPPED | OUTPATIENT
Start: 2017-06-13 | End: 2017-01-01 | Stop reason: SDUPTHER

## 2017-06-13 RX ORDER — METFORMIN HYDROCHLORIDE 500 MG/1
500 TABLET ORAL 2 TIMES DAILY WITH MEALS
Qty: 180 TAB | Refills: 1 | Status: SHIPPED | OUTPATIENT
Start: 2017-06-13 | End: 2017-01-01 | Stop reason: SDUPTHER

## 2017-06-13 NOTE — TELEPHONE ENCOUNTER
----- Message from Lila Guzman sent at 6/9/2017  6:14 PM EDT -----  Regarding: Dr. Corine Ellis,    Pt is returning the practice call. Pt states he need to have the cataract removed from his left eye, of which this may not be done until sometime in July. Best contact number is [phone].     Thanks,  Martha Francois

## 2017-06-15 NOTE — TELEPHONE ENCOUNTER
Spoke with pt.  Explained to him the 45 day pending requirement for his vision and must return in that time in order to complete current DOT, otherwise will need new physical.

## 2017-08-18 ENCOUNTER — OFFICE VISIT (OUTPATIENT)
Dept: FAMILY MEDICINE CLINIC | Age: 82
End: 2017-08-18

## 2017-08-18 VITALS
BODY MASS INDEX: 20.7 KG/M2 | OXYGEN SATURATION: 98 % | RESPIRATION RATE: 18 BRPM | HEART RATE: 82 BPM | WEIGHT: 144.6 LBS | DIASTOLIC BLOOD PRESSURE: 69 MMHG | TEMPERATURE: 95.6 F | HEIGHT: 70 IN | SYSTOLIC BLOOD PRESSURE: 126 MMHG

## 2017-08-18 DIAGNOSIS — E11.9 CONTROLLED TYPE 2 DIABETES MELLITUS WITHOUT COMPLICATION, WITHOUT LONG-TERM CURRENT USE OF INSULIN (HCC): Chronic | ICD-10-CM

## 2017-08-18 DIAGNOSIS — H25.9 AGE-RELATED CATARACT OF LEFT EYE, UNSPECIFIED AGE-RELATED CATARACT TYPE: Primary | ICD-10-CM

## 2017-08-18 DIAGNOSIS — I08.0 MITRAL REGURGITATION AND AORTIC STENOSIS: ICD-10-CM

## 2017-08-18 DIAGNOSIS — E78.2 MIXED HYPERLIPIDEMIA: Chronic | ICD-10-CM

## 2017-08-18 DIAGNOSIS — I25.10 CORONARY ARTERY DISEASE INVOLVING NATIVE CORONARY ARTERY OF NATIVE HEART WITHOUT ANGINA PECTORIS: ICD-10-CM

## 2017-08-18 NOTE — MR AVS SNAPSHOT
Visit Information Date & Time Provider Department Dept. Phone Encounter #  
 8/18/2017  7:40 AM Gorge Torres MD [place] [address] Primary Care [phone] [id number] Upcoming Health Maintenance Date Due  
 EYE EXAM RETINAL OR DILATED Q1 10/31/1943 ZOSTER VACCINE AGE 60> 8/31/1993 GLAUCOMA SCREENING Q2Y 10/31/1998 INFLUENZA AGE 9 TO ADULT 8/1/2017 HEMOGLOBIN A1C Q6M 8/3/2017 Pneumococcal 65+ Low/Medium Risk (2 of 2 - PPSV23) 2/3/2018 FOOT EXAM Q1 2/3/2018 MICROALBUMIN Q1 2/3/2018 LIPID PANEL Q1 2/3/2018 MEDICARE YEARLY EXAM 2/4/2018 DTaP/Tdap/Td series (2 - Td) 2/3/2027 Allergies as of 8/18/2017  Review Complete On: 8/18/2017 By: Gorge Torres MD  
  
 Severity Noted Reaction Type Reactions Aspirin Medium 01/05/2012   Side Effect Other (comments) \"makes stomach raw\" Current Immunizations  Never Reviewed No immunizations on file. Not reviewed this visit You Were Diagnosed With   
  
 Codes Comments Age-related cataract of left eye, unspecified age-related cataract type    -  Primary ICD-10-CM: H25.9 ICD-9-CM: 366.10 Controlled type 2 diabetes mellitus without complication, without long-term current use of insulin ([facility]     ICD-10-CM: E11.9 ICD-9-CM: 250.00 Mixed hyperlipidemia     ICD-10-CM: E78.2 ICD-9-CM: 272.2 Vitals BP Pulse Temp Resp Height(growth percentile) Weight(growth percentile) 126/69 (BP 1 Location: Left arm, BP Patient Position: Sitting) 82 95.6 °F (35.3 °C) (Oral) 18 5' 10\" (1.778 m) 144 lb 9.6 oz (65.6 kg) SpO2 BMI Smoking Status 98% 20.75 kg/m2 Former Smoker Vitals History BMI and BSA Data Body Mass Index Body Surface Area 20.75 kg/m 2 1.8 m 2 Preferred Pharmacy Pharmacy Name Phone [address] [facility] [address] [place] [address] [phone] Your Updated Medication List  
  
   
 This list is accurate as of: 8/18/17  8:18 AM.  Always use your most recent med list.  
  
  
  
  
 ammonium lactate 12 % lotion Commonly known as:  LAC-HYDRIN Apply 5 g to affected area two (2) times a day for 90 days. rub in to affected area well  
  
 calcium 500 mg Tab Take  by mouth. cholecalciferol (vitamin d3) 400 unit Cap Commonly known as:  VITAMIN D3 Take 400 Units by mouth daily. Lancets Misc Use to check blood sugars on monitor  
  
 magnesium 250 mg Tab Take 1 Tab by mouth daily. metFORMIN 500 mg tablet Commonly known as:  GLUCOPHAGE Take 1 Tab by mouth two (2) times daily (with meals). OTHER  
  
 simvastatin 40 mg tablet Commonly known as:  ZOCOR  
TAKE 1 TABLET EVERY NIGHT We Performed the Following  DIABETES FOOT EXAM [HM7 Custom] LIPID PANEL [09896 CPT(R)] METABOLIC PANEL, COMPREHENSIVE [76004 CPT(R)] VT COLLECTION VENOUS BLOOD,VENIPUNCTURE B9110101 CPT(R)] Introducing [facility]! Yahaira Espinoza introduces [employer] patient portal. Now you can access parts of your medical record, email your doctor's office, and request medication refills online. 1. In your internet browser, go to [email or web address] 2. Click on the First Time User? Click Here link in the Sign In box. You will see the New Member Sign Up page. 3. Enter your [employer] Access Code exactly as it appears below. You will not need to use this code after youve completed the sign-up process. If you do not sign up before the expiration date, you must request a new code. · [employer] Access Code: [email or web address] Expires: 11/16/2017  8:18 AM 
 
4. Enter the last four digits of your Social Security Number (xxxx) and Date of Birth (mm/dd/yyyy) as indicated and click Submit. You will be taken to the next sign-up page. 5. Create a [email or web address] ID.  This will be your [employer] login ID and cannot be changed, so think of one that is secure and easy to remember. 6. Create a [employer] password. You can change your password at any time. 7. Enter your Password Reset Question and Answer. This can be used at a later time if you forget your password. 8. Enter your e-mail address. You will receive e-mail notification when new information is available in [address] 9. Click Sign Up. You can now view and download portions of your medical record. 10. Click the Download Summary menu link to download a portable copy of your medical information. If you have questions, please visit the Frequently Asked Questions section of the [employer] website. Remember, [employer] is NOT to be used for urgent needs. For medical emergencies, dial 911. Now available from your iPhone and Android! Please provide this summary of care documentation to your next provider. Your primary care clinician is listed as Mingo Rodriguez. If you have any questions after today's visit, please call [phone].

## 2017-08-18 NOTE — PROGRESS NOTES
Cici Hanna is a 80 y.o. male who presents with the following:  Chief Complaint   Patient presents with    Pre-op Exam     Left cataract       Pre-op Exam   The history is provided by the patient (Patient is in for medical clearance for a left cataract removal and IOL placement patient is having no untoward symptoms at this time). Pertinent negatives include no chest pain, no abdominal pain, no headaches and no shortness of breath. Associated symptoms comments: The patient has stable coronary artery disease and is recently had a cardiac workup which showed mitral regurgitation and aortic stenosis but a 70% ejection fraction in general he is asymptomatic. The patient has diabetes mellitus which is well controlled with a polyuria and he does have actinic keratoses and dry skin dermatitis as well as hyperlipidemia which is well controlled on a statin without muscle pain or weakness. He takes magnesium to correct his hypomagnesemia and vitamin D to creatinine correct his hypovitaminosis D. The patient states he has had a little trouble maintaining his body weight but is having no difficulty with urination or defecation. His main problem is his vision getting blurry in the left eye. .       Allergies   Allergen Reactions    Aspirin Other (comments)     \"makes stomach raw\"       Current Outpatient Prescriptions   Medication Sig    simvastatin (ZOCOR) 40 mg tablet TAKE 1 TABLET EVERY NIGHT    cholecalciferol, vitamin d3, (VITAMIN D3) 400 unit cap Take 400 Units by mouth daily.  metFORMIN (GLUCOPHAGE) 500 mg tablet Take 1 Tab by mouth two (2) times daily (with meals).  magnesium 250 mg tab Take 1 Tab by mouth daily.  OTHER     Lancets misc Use to check blood sugars on monitor    calcium 500 mg Tab Take  by mouth.  ammonium lactate (LAC-HYDRIN) 12 % lotion Apply 5 g to affected area two (2) times a day for 90 days.  rub in to affected area well     No current facility-administered medications for this visit. Past Medical History:   Diagnosis Date    Arthritis     hips    CAD (coronary artery disease) 2008    quintuple bypass-[place]    Calculus of kidney     Cancer ([facility]     skin cancers (removed)    Diabetes ([facility] 1990's    Type II    GERD (gastroesophageal reflux disease)     Hypercholesterolemia     Other ill-defined conditions     elevated cholesterol       Past Surgical History:   Procedure Laterality Date    CARDIAC SURG PROCEDURE UNLIST  2008    quintuple bypass-[place]    COLONOSCOPY      HX CHOLECYSTECTOMY      HX ORTHOPAEDIC  1996 & 2009    hip replacement- bilateral    HX UROLOGICAL  1965    kidney stone extraction       Family History   Problem Relation Age of Onset    Diabetes Mother     Heart Disease Father     Heart Disease Brother        Social History     Social History    Marital status:      Spouse name: N/A    Number of children: N/A    Years of education: N/A     Social History Main Topics    Smoking status: Former Smoker    Smokeless tobacco: Never Used      Comment: quit smoking 1954    Alcohol use No    Drug use: No    Sexual activity: Not Asked     Other Topics Concern    None     Social History Narrative       Review of Systems   Constitutional: Negative for chills, fever, malaise/fatigue and weight loss. HENT: Negative for congestion, hearing loss, sore throat and tinnitus. Eyes: Negative for blurred vision, pain and discharge. Respiratory: Negative for cough, shortness of breath and wheezing. Cardiovascular: Negative for chest pain, palpitations, orthopnea, claudication and leg swelling. Gastrointestinal: Negative for abdominal pain, constipation and heartburn. Genitourinary: Negative for dysuria, frequency and urgency. Musculoskeletal: Negative for falls, joint pain and myalgias. Skin: Negative for itching and rash.    Neurological: Positive for tingling (Patient has occasional tingling in his right hand usually when he wakes up in the morning and it clears rapidly. ). Negative for dizziness, tremors and headaches. Endo/Heme/Allergies: Negative for environmental allergies and polydipsia. Psychiatric/Behavioral: Negative for depression and substance abuse. The patient is not nervous/anxious. Visit Vitals    /69 (BP 1 Location: Left arm, BP Patient Position: Sitting)    Pulse 82    Temp 95.6 °F (35.3 °C) (Oral)    Resp 18    Ht 5' 10\" (1.778 m)    Wt 144 lb 9.6 oz (65.6 kg)    SpO2 98%    BMI 20.75 kg/m2     Physical Exam   Constitutional: He is oriented to person, place, and time and well-developed, well-nourished, and in no distress. HENT:   Head: Normocephalic and atraumatic. Nose: Nose normal.   Mouth/Throat: Oropharynx is clear and moist.   Eyes: Conjunctivae and EOM are normal. Pupils are equal, round, and reactive to light. Neck: Normal range of motion. Neck supple. No JVD present. No tracheal deviation present. No thyromegaly present. Cardiovascular: Normal rate, regular rhythm and intact distal pulses. Exam reveals no gallop and no friction rub. Murmur (Grade 2/6 systolic murmur loudest over the aortic area) heard. Pulmonary/Chest: Effort normal and breath sounds normal. No respiratory distress. He has no wheezes. He has no rales. He exhibits no tenderness. Abdominal: Soft. Bowel sounds are normal. He exhibits no distension and no mass. There is no tenderness. There is no rebound and no guarding. Musculoskeletal: Normal range of motion. He exhibits no edema or tenderness. Lymphadenopathy:     He has no cervical adenopathy. Neurological: He is alert and oriented to person, place, and time. He has normal reflexes. No cranial nerve deficit. He exhibits normal muscle tone. Gait normal. Coordination normal.   Skin: Skin is warm and dry. No rash noted. No erythema. Psychiatric: Mood, memory, affect and judgment normal.   Vitals reviewed.         ICD-10-CM ICD-9-CM    1. Age-related cataract of left eye, unspecified age-related cataract type H25.9 366.10    2. Controlled type 2 diabetes mellitus without complication, without long-term current use of insulin ([facility]) E11.9 250.00 MO COLLECTION VENOUS BLOOD,VENIPUNCTURE      LIPID PANEL      METABOLIC PANEL, COMPREHENSIVE       DIABETES FOOT EXAM      CBC WITH AUTOMATED DIFF   3. Mixed hyperlipidemia E78.2 272.2 MO COLLECTION VENOUS BLOOD,VENIPUNCTURE      LIPID PANEL      METABOLIC PANEL, COMPREHENSIVE      CBC WITH AUTOMATED DIFF   4. Mitral regurgitation and aortic stenosis I08.0 396.2        Orders Placed This Encounter    LIPID PANEL    METABOLIC PANEL, COMPREHENSIVE    CBC WITH AUTOMATED DIFF     DIABETES FOOT EXAM    MO COLLECTION VENOUS BLOOD,VENIPUNCTURE   Have suggested to the patient that he start taking Ensure with each meal as his beverage to help maintain his body weight.     Follow-up Disposition: Not on Natalee Ovalles MD

## 2017-08-19 LAB
ALBUMIN SERPL-MCNC: 4.3 G/DL (ref 3.5–4.7)
ALBUMIN/GLOB SERPL: 1.6 {RATIO} (ref 1.2–2.2)
ALP SERPL-CCNC: 67 IU/L (ref 39–117)
ALT SERPL-CCNC: 10 IU/L (ref 0–44)
AST SERPL-CCNC: 17 IU/L (ref 0–40)
BASOPHILS # BLD AUTO: 0 X10E3/UL (ref 0–0.2)
BASOPHILS NFR BLD AUTO: 1 %
BILIRUB SERPL-MCNC: 0.5 MG/DL (ref 0–1.2)
BUN SERPL-MCNC: 32 MG/DL (ref 8–27)
BUN/CREAT SERPL: 21 (ref 10–24)
CALCIUM SERPL-MCNC: 10 MG/DL (ref 8.6–10.2)
CHLORIDE SERPL-SCNC: 101 MMOL/L (ref 96–106)
CHOLEST SERPL-MCNC: 132 MG/DL (ref 100–199)
CO2 SERPL-SCNC: 24 MMOL/L (ref 18–29)
CREAT SERPL-MCNC: 1.55 MG/DL (ref 0.76–1.27)
EOSINOPHIL # BLD AUTO: 0.1 X10E3/UL (ref 0–0.4)
EOSINOPHIL NFR BLD AUTO: 2 %
ERYTHROCYTE [DISTWIDTH] IN BLOOD BY AUTOMATED COUNT: 13.4 % (ref 12.3–15.4)
EST. AVERAGE GLUCOSE BLD GHB EST-MCNC: 128 MG/DL
GLOBULIN SER CALC-MCNC: 2.7 G/DL (ref 1.5–4.5)
GLUCOSE SERPL-MCNC: 119 MG/DL (ref 65–99)
HBA1C MFR BLD: 6.1 % (ref 4.8–5.6)
HCT VFR BLD AUTO: 38.8 % (ref 37.5–51)
HDLC SERPL-MCNC: 48 MG/DL
HGB BLD-MCNC: 13.5 G/DL (ref 12.6–17.7)
IMM GRANULOCYTES # BLD: 0 X10E3/UL (ref 0–0.1)
IMM GRANULOCYTES NFR BLD: 0 %
INTERPRETATION, 910389: NORMAL
INTERPRETATION: NORMAL
LDLC SERPL CALC-MCNC: 70 MG/DL (ref 0–99)
LYMPHOCYTES # BLD AUTO: 1.6 X10E3/UL (ref 0.7–3.1)
LYMPHOCYTES NFR BLD AUTO: 33 %
MCH RBC QN AUTO: 31.5 PG (ref 26.6–33)
MCHC RBC AUTO-ENTMCNC: 34.8 G/DL (ref 31.5–35.7)
MCV RBC AUTO: 91 FL (ref 79–97)
MONOCYTES # BLD AUTO: 0.7 X10E3/UL (ref 0.1–0.9)
MONOCYTES NFR BLD AUTO: 16 %
NEUTROPHILS # BLD AUTO: 2.2 X10E3/UL (ref 1.4–7)
NEUTROPHILS NFR BLD AUTO: 48 %
PDF IMAGE, 910387: NORMAL
PLATELET # BLD AUTO: 174 X10E3/UL (ref 150–379)
POTASSIUM SERPL-SCNC: 4.3 MMOL/L (ref 3.5–5.2)
PROT SERPL-MCNC: 7 G/DL (ref 6–8.5)
RBC # BLD AUTO: 4.28 X10E6/UL (ref 4.14–5.8)
SODIUM SERPL-SCNC: 141 MMOL/L (ref 134–144)
TRIGL SERPL-MCNC: 71 MG/DL (ref 0–149)
VLDLC SERPL CALC-MCNC: 14 MG/DL (ref 5–40)
WBC # BLD AUTO: 4.7 X10E3/UL (ref 3.4–10.8)

## 2017-12-05 NOTE — MR AVS SNAPSHOT
Visit Information Date & Time Provider Department Dept. Phone Encounter #  
 12/5/2017  9:40 AM Pepito Hanley MD [facility] [phone] [id number] Upcoming Health Maintenance Date Due  
 EYE EXAM RETINAL OR DILATED Q1 10[birth year]31[birth year]1943 GLAUCOMA SCREENING Q2Y 10/31/1998 Pneumococcal 65+ Low/Medium Risk (2 of 2 - PPSV23) 2/3/2018 MICROALBUMIN Q1 2/3/2018 MEDICARE YEARLY EXAM 2/4/2018 HEMOGLOBIN A1C Q6M 2/18/2018 FOOT EXAM Q1 8/18/2018 LIPID PANEL Q1 8/18/2018 DTaP/Tdap/Td series (2 - Td) 2/3/2027 Allergies as of 12/5/2017  Review Complete On: 12/5/2017 By: Pepito Hanley MD  
  
 Severity Noted Reaction Type Reactions Aspirin Medium 01/05/2012   Side Effect Other (comments) \"makes stomach raw\" Current Immunizations  Never Reviewed No immunizations on file. Not reviewed this visit You Were Diagnosed With   
  
 Codes Comments Acute nasopharyngitis    -  Primary ICD-10-CM: Yoanna Edyta ICD-9-CM: 413 Vitals BP Pulse Temp Resp Height(growth percentile) Weight(growth percentile) 112/67 (BP 1 Location: Left arm) 71 98 °F (36.7 °C) 16 5' 10\" (1.778 m) 146 lb (66.2 kg) SpO2 BMI Smoking Status 98% 20.95 kg/m2 Former Smoker BMI and BSA Data Body Mass Index Body Surface Area  
 20.95 kg/m 2 1.81 m 2 Preferred Pharmacy Pharmacy Name Phone [facility] [address] AT [facility] [address] Juan Carrillo [phone] Your Updated Medication List  
  
   
This list is accurate as of: 12/5/17 10:11 AM.  Always use your most recent med list.  
  
  
  
  
 ammonium lactate 12 % lotion Commonly known as:  LAC-HYDRIN Apply 5 g to affected area two (2) times a day for 90 days. rub in to affected area well  
  
 calcium 500 mg Tab Take  by mouth. cholecalciferol (vitamin d3) 400 unit Cap Commonly known as:  VITAMIN D3  
 Take 400 Units by mouth daily. ipratropium 0.06 % nasal spray Commonly known as:  ATROVENT  
1 [place] by Both Nostrils route four (4) times daily. Indications: Rhinorrhea Lancets Misc Use to check blood sugars on monitor  
  
 magnesium 250 mg Tab Take 1 Tab by mouth daily. metFORMIN 500 mg tablet Commonly known as:  GLUCOPHAGE Take 1 Tab by mouth two (2) times daily (with meals). OTHER  
  
 predniSONE 20 mg tablet Commonly known as:  DELTASONE  
5 po every day x 4 days then 4 on day 5, 3 on day 6, 2 on day 7, and 1 on day 8  
  
 simvastatin 40 mg tablet Commonly known as:  ZOCOR  
TAKE 1 TABLET EVERY NIGHT Prescriptions Sent to Pharmacy Refills  
 predniSONE (DELTASONE) 20 mg tablet 0 Si po every day x 4 days then 4 on day 5, 3 on day 6, 2 on day 7, and 1 on day 8 Class: Normal  
 Pharmacy: [facility] [address]  Ph #: [phone]  
 ipratropium (ATROVENT) 0.06 % nasal spray 0 [place] by Both Nostrils route four (4) times daily. Indications: Rhinorrhea Class: Normal  
 Pharmacy: [facility] [address]  Ph #: [phone] Route: Both Nostrils Introducing [facility]! Chrystal Lovett introduces [employer] patient portal. Now you can access parts of your medical record, email your doctor's office, and request medication refills online. 1. In your internet browser, go to [email or web address] ([email or web address])/[email or web address] 2. Click on the First Time User? Click Here link in the Sign In box. You will see the New Member Sign Up page. 3. Enter your [employer] Access Code exactly as it appears below. You will not need to use this code after youve completed the sign-up process. If you do not sign up before the expiration date, you must request a new code.  
 
· [employer] Access Code: [address] 
 Expires: 3/5/2018 10:11 AM 
 
4. Enter the last four digits of your Social Security Number (xxxx) and Date of Birth (mm/dd/yyyy) as indicated and click Submit. You will be taken to the next sign-up page. 5. Create a [employer] ID. This will be your [employer] login ID and cannot be changed, so think of one that is secure and easy to remember. 6. Create a [employer] password. You can change your password at any time. 7. Enter your Password Reset Question and Answer. This can be used at a later time if you forget your password. 8. Enter your e-mail address. You will receive e-mail notification when new information is available in [address] 9. Click Sign Up. You can now view and download portions of your medical record. 10. Click the Download Summary menu link to download a portable copy of your medical information. If you have questions, please visit the Frequently Asked Questions section of the [employer] website. Remember, [employer] is NOT to be used for urgent needs. For medical emergencies, dial 911. Now available from your iPhone and Android! Please provide this summary of care documentation to your next provider. Your primary care clinician is listed as Marycruz Hadley. If you have any questions after today's visit, please call [phone].

## 2017-12-05 NOTE — PROGRESS NOTES
Yousif Addison is a 80 y.o. male who presents with the following:  Chief Complaint   Patient presents with    Cold Symptoms       Cold Symptoms   The history is provided by the patient (Patient with acute nasopharyngitis for the past 4 days what like something to ease his symptomatic cough. ). Associated symptoms include sore throat. Pertinent negatives include no chest pain, no chills, no ear pain, no headaches, no myalgias, no shortness of breath, no wheezing, no nausea and no vomiting. Allergies   Allergen Reactions    Aspirin Other (comments)     \"makes stomach raw\"       Current Outpatient Prescriptions   Medication Sig    predniSONE (DELTASONE) 20 mg tablet 5 po every day x 4 days then 4 on day 5, 3 on day 6, 2 on day 7, and 1 on day 8    ipratropium (ATROVENT) 0.06 % nasal spray 1 [place] by Both Nostrils route four (4) times daily. Indications: Rhinorrhea    simvastatin (ZOCOR) 40 mg tablet TAKE 1 TABLET EVERY NIGHT    cholecalciferol, vitamin d3, (VITAMIN D3) 400 unit cap Take 400 Units by mouth daily.  magnesium 250 mg tab Take 1 Tab by mouth daily.  metFORMIN (GLUCOPHAGE) 500 mg tablet Take 1 Tab by mouth two (2) times daily (with meals).  OTHER     Lancets misc Use to check blood sugars on monitor    calcium 500 mg Tab Take  by mouth.  ammonium lactate (LAC-HYDRIN) 12 % lotion Apply 5 g to affected area two (2) times a day for 90 days. rub in to affected area well     No current facility-administered medications for this visit.         Past Medical History:   Diagnosis Date    Arthritis     hips    CAD (coronary artery disease) 2008    quintuple bypass-[place]    Calculus of kidney     Cancer (HCC)     skin cancers (removed)    Diabetes ([facility] [address] 1990's    Type II    GERD (gastroesophageal reflux disease)     Hypercholesterolemia     Other ill-defined conditions(799.89)     elevated cholesterol       Past Surgical History:   Procedure Laterality Date    CARDIAC SURG PROCEDURE UNLIST  2008    quintuple bypass-[place]    COLONOSCOPY      HX CHOLECYSTECTOMY      HX ORTHOPAEDIC  1996 & 2009    hip replacement- bilateral    HX UROLOGICAL  1965    kidney stone extraction       Family History   Problem Relation Age of Onset    Diabetes Mother     Heart Disease Father     Heart Disease Brother        Social History     Social History    Marital status:      Spouse name: N/A    Number of children: N/A    Years of education: N/A     Social History Main Topics    Smoking status: Former Smoker    Smokeless tobacco: Never Used      Comment: quit smoking 1954    Alcohol use No    Drug use: No    Sexual activity: Not Asked     Other Topics Concern    None     Social History Narrative       Review of Systems   Constitutional: Positive for malaise/fatigue. Negative for chills and fever. HENT: Positive for congestion and sore throat. Negative for ear discharge, ear pain, hearing loss, nosebleeds and tinnitus. Eyes: Negative for blurred vision, double vision, photophobia and discharge. Respiratory: Positive for cough. Negative for sputum production, shortness of breath, wheezing and stridor. Cardiovascular: Negative for chest pain, palpitations, orthopnea and PND. Gastrointestinal: Negative for abdominal pain, diarrhea, heartburn, nausea and vomiting. Genitourinary: Negative for dysuria, frequency and urgency. Musculoskeletal: Negative for myalgias and neck pain. Skin: Negative for itching and rash. Neurological: Negative for dizziness, tingling and headaches. Endo/Heme/Allergies: Does not bruise/bleed easily. Psychiatric/Behavioral: Negative for depression. The patient has insomnia. The patient is not nervous/anxious.         Visit Vitals    /67 (BP 1 Location: Left arm)    Pulse 71    Temp 98 °F (36.7 °C)    Resp 16    Ht 5' 10\" (1.778 m)    Wt 146 lb (66.2 kg)    SpO2 98%    BMI 20.95 kg/m2     Physical Exam   Constitutional: He is oriented to person, place, and time. He appears distressed. Has coryza that is clear - mild distress   HENT:   Head: Normocephalic and atraumatic. Right Ear: External ear normal.   Left Ear: External ear normal.   Mouth/Throat: No oropharyngeal exudate. Red mm's in the nose and tht   Eyes: Conjunctivae and EOM are normal. Pupils are equal, round, and reactive to light. Right eye exhibits no discharge. Left eye exhibits no discharge. Neck: Normal range of motion. Neck supple. No tracheal deviation present. No thyromegaly present. Cardiovascular: Normal rate, regular rhythm, normal heart sounds and intact distal pulses. Exam reveals no gallop and no friction rub. No murmur heard. Pulmonary/Chest: Effort normal and breath sounds normal. No stridor. No respiratory distress. He has no wheezes. He has no rales. He exhibits no tenderness. Abdominal: He exhibits no distension and no mass. There is no tenderness. There is no guarding. Musculoskeletal: He exhibits no edema or tenderness. Lymphadenopathy:     He has no cervical adenopathy. Neurological: He is alert and oriented to person, place, and time. He has normal reflexes. Gait normal.   Skin: Skin is warm and dry. No rash noted. He is not diaphoretic. No erythema. Psychiatric: Mood, memory, affect and judgment normal.         ICD-10-CM ICD-9-CM    1. Acute nasopharyngitis J00 460 predniSONE (DELTASONE) 20 mg tablet      ipratropium (ATROVENT) 0.06 % nasal spray       Orders Placed This Encounter    predniSONE (DELTASONE) 20 mg tablet     Si po every day x 4 days then 4 on day 5, 3 on day 6, 2 on day 7, and 1 on day 8     Dispense:  30 Tab     Refill:  0    ipratropium (ATROVENT) 0.06 % nasal spray     Si [place] by Both Nostrils route four (4) times daily.  Indications: Rhinorrhea     Dispense:  15 mL     Refill:  0       Follow-up Disposition: Not on Guevara Medley MD

## 2018-01-01 ENCOUNTER — OFFICE VISIT (OUTPATIENT)
Dept: FAMILY MEDICINE CLINIC | Age: 83
End: 2018-01-01

## 2018-01-01 ENCOUNTER — TELEPHONE (OUTPATIENT)
Dept: FAMILY MEDICINE CLINIC | Age: 83
End: 2018-01-01

## 2018-01-01 ENCOUNTER — DOCUMENTATION ONLY (OUTPATIENT)
Dept: FAMILY MEDICINE CLINIC | Age: 83
End: 2018-01-01

## 2018-01-01 ENCOUNTER — TELEPHONE (OUTPATIENT)
Dept: ONCOLOGY | Age: 83
End: 2018-01-01

## 2018-01-01 ENCOUNTER — OFFICE VISIT (OUTPATIENT)
Dept: ONCOLOGY | Age: 83
End: 2018-01-01

## 2018-01-01 VITALS
DIASTOLIC BLOOD PRESSURE: 88 MMHG | RESPIRATION RATE: 18 BRPM | WEIGHT: 136.4 LBS | OXYGEN SATURATION: 98 % | SYSTOLIC BLOOD PRESSURE: 105 MMHG | HEIGHT: 70 IN | TEMPERATURE: 95.6 F | HEART RATE: 94 BPM | BODY MASS INDEX: 19.53 KG/M2

## 2018-01-01 VITALS
RESPIRATION RATE: 18 BRPM | HEIGHT: 73 IN | OXYGEN SATURATION: 97 % | SYSTOLIC BLOOD PRESSURE: 120 MMHG | TEMPERATURE: 97.5 F | DIASTOLIC BLOOD PRESSURE: 87 MMHG | HEART RATE: 57 BPM

## 2018-01-01 VITALS
SYSTOLIC BLOOD PRESSURE: 119 MMHG | OXYGEN SATURATION: 97 % | HEIGHT: 70 IN | WEIGHT: 146 LBS | BODY MASS INDEX: 20.9 KG/M2 | RESPIRATION RATE: 18 BRPM | DIASTOLIC BLOOD PRESSURE: 76 MMHG | HEART RATE: 57 BPM

## 2018-01-01 VITALS
SYSTOLIC BLOOD PRESSURE: 126 MMHG | BODY MASS INDEX: 18.75 KG/M2 | RESPIRATION RATE: 18 BRPM | DIASTOLIC BLOOD PRESSURE: 72 MMHG | OXYGEN SATURATION: 95 % | HEART RATE: 96 BPM | TEMPERATURE: 97.7 F | HEIGHT: 70 IN | WEIGHT: 131 LBS

## 2018-01-01 VITALS
TEMPERATURE: 97.4 F | BODY MASS INDEX: 19.44 KG/M2 | SYSTOLIC BLOOD PRESSURE: 117 MMHG | HEIGHT: 70 IN | DIASTOLIC BLOOD PRESSURE: 71 MMHG | RESPIRATION RATE: 18 BRPM | HEART RATE: 82 BPM | OXYGEN SATURATION: 97 % | WEIGHT: 135.8 LBS

## 2018-01-01 VITALS
TEMPERATURE: 98.2 F | HEART RATE: 74 BPM | HEIGHT: 70 IN | DIASTOLIC BLOOD PRESSURE: 77 MMHG | SYSTOLIC BLOOD PRESSURE: 133 MMHG | WEIGHT: 151.6 LBS | OXYGEN SATURATION: 98 % | RESPIRATION RATE: 18 BRPM | BODY MASS INDEX: 21.7 KG/M2

## 2018-01-01 DIAGNOSIS — L29.9 PRURITIC DERMATITIS: ICD-10-CM

## 2018-01-01 DIAGNOSIS — I25.10 CORONARY ARTERY DISEASE INVOLVING NATIVE CORONARY ARTERY OF NATIVE HEART WITHOUT ANGINA PECTORIS: ICD-10-CM

## 2018-01-01 DIAGNOSIS — E11.9 CONTROLLED TYPE 2 DIABETES MELLITUS WITHOUT COMPLICATION, WITHOUT LONG-TERM CURRENT USE OF INSULIN (HCC): Primary | Chronic | ICD-10-CM

## 2018-01-01 DIAGNOSIS — C83.39 DIFFUSE LARGE B-CELL LYMPHOMA OF SOLID ORGAN EXCLUDING SPLEEN (HCC): Primary | ICD-10-CM

## 2018-01-01 DIAGNOSIS — L85.3 DRY SKIN DERMATITIS: Primary | ICD-10-CM

## 2018-01-01 DIAGNOSIS — R53.82 CHRONIC FATIGUE: ICD-10-CM

## 2018-01-01 DIAGNOSIS — R91.8 LUNG MASS: Primary | ICD-10-CM

## 2018-01-01 DIAGNOSIS — R63.0 POOR APPETITE: ICD-10-CM

## 2018-01-01 DIAGNOSIS — R63.4 ABNORMAL WEIGHT LOSS: ICD-10-CM

## 2018-01-01 DIAGNOSIS — E78.2 MIXED HYPERLIPIDEMIA: Chronic | ICD-10-CM

## 2018-01-01 DIAGNOSIS — R91.8 MASS OF RIGHT LUNG: ICD-10-CM

## 2018-01-01 DIAGNOSIS — R63.4 WEIGHT LOSS: ICD-10-CM

## 2018-01-01 DIAGNOSIS — R53.1 WEAKNESS GENERALIZED: ICD-10-CM

## 2018-01-01 DIAGNOSIS — E11.9 CONTROLLED TYPE 2 DIABETES MELLITUS WITHOUT COMPLICATION, WITHOUT LONG-TERM CURRENT USE OF INSULIN (HCC): Chronic | ICD-10-CM

## 2018-01-01 DIAGNOSIS — H91.90 HEARING LOSS, UNSPECIFIED HEARING LOSS TYPE, UNSPECIFIED LATERALITY: Primary | ICD-10-CM

## 2018-01-01 DIAGNOSIS — R91.1 LUNG NODULE SEEN ON IMAGING STUDY: Primary | ICD-10-CM

## 2018-01-01 DIAGNOSIS — E78.5 DYSLIPIDEMIA: ICD-10-CM

## 2018-01-01 DIAGNOSIS — E55.9 HYPOVITAMINOSIS D: ICD-10-CM

## 2018-01-01 DIAGNOSIS — R63.4 WEIGHT LOSS, UNINTENTIONAL: ICD-10-CM

## 2018-01-01 DIAGNOSIS — R91.1 NODULE OF APEX OF RIGHT LUNG: ICD-10-CM

## 2018-01-01 DIAGNOSIS — R63.0 ANOREXIA: Primary | ICD-10-CM

## 2018-01-01 DIAGNOSIS — R06.2 WHEEZING: ICD-10-CM

## 2018-01-01 DIAGNOSIS — L85.3 XEROSIS OF SKIN: ICD-10-CM

## 2018-01-01 DIAGNOSIS — I08.0 MITRAL REGURGITATION AND AORTIC STENOSIS: ICD-10-CM

## 2018-01-01 DIAGNOSIS — N18.9 CHRONIC KIDNEY DISEASE, UNSPECIFIED CKD STAGE: Primary | ICD-10-CM

## 2018-01-01 DIAGNOSIS — R06.2 WHEEZING: Primary | ICD-10-CM

## 2018-01-01 DIAGNOSIS — R63.0 ANOREXIA: ICD-10-CM

## 2018-01-01 DIAGNOSIS — R13.10 DYSPHAGIA, UNSPECIFIED TYPE: ICD-10-CM

## 2018-01-01 DIAGNOSIS — Z00.00 MEDICARE ANNUAL WELLNESS VISIT, SUBSEQUENT: Primary | ICD-10-CM

## 2018-01-01 LAB
1,25(OH)2D3 SERPL-MCNC: 58.7 PG/ML (ref 19.9–79.3)
ALBUMIN SERPL-MCNC: 4.3 G/DL (ref 3.5–4.7)
ALBUMIN SERPL-MCNC: 4.3 G/DL (ref 3.5–4.7)
ALBUMIN SERPL-MCNC: 4.5 G/DL (ref 3.5–4.7)
ALBUMIN/GLOB SERPL: 1.5 {RATIO} (ref 1.2–2.2)
ALBUMIN/GLOB SERPL: 1.8 {RATIO} (ref 1.2–2.2)
ALBUMIN/GLOB SERPL: 1.9 {RATIO} (ref 1.2–2.2)
ALP SERPL-CCNC: 74 IU/L (ref 39–117)
ALP SERPL-CCNC: 81 IU/L (ref 39–117)
ALP SERPL-CCNC: 83 IU/L (ref 39–117)
ALT SERPL-CCNC: 11 IU/L (ref 0–44)
ALT SERPL-CCNC: 8 IU/L (ref 0–44)
ALT SERPL-CCNC: 9 IU/L (ref 0–44)
AST SERPL-CCNC: 15 IU/L (ref 0–40)
AST SERPL-CCNC: 18 IU/L (ref 0–40)
AST SERPL-CCNC: 20 IU/L (ref 0–40)
BASOPHILS # BLD AUTO: 0 X10E3/UL (ref 0–0.2)
BASOPHILS # BLD AUTO: 0 X10E3/UL (ref 0–0.2)
BASOPHILS NFR BLD AUTO: 0 %
BASOPHILS NFR BLD AUTO: 1 %
BILIRUB SERPL-MCNC: 0.6 MG/DL (ref 0–1.2)
BILIRUB SERPL-MCNC: 0.6 MG/DL (ref 0–1.2)
BILIRUB SERPL-MCNC: 0.7 MG/DL (ref 0–1.2)
BUN SERPL-MCNC: 20 MG/DL (ref 8–27)
BUN SERPL-MCNC: 32 MG/DL (ref 8–27)
BUN SERPL-MCNC: 33 MG/DL (ref 8–27)
BUN/CREAT SERPL: 14 (ref 10–24)
BUN/CREAT SERPL: 17 (ref 10–24)
BUN/CREAT SERPL: 21 (ref 10–24)
CALCIUM SERPL-MCNC: 10.5 MG/DL (ref 8.6–10.2)
CALCIUM SERPL-MCNC: 12.7 MG/DL (ref 8.6–10.2)
CALCIUM SERPL-MCNC: 9.7 MG/DL (ref 8.6–10.2)
CHLORIDE SERPL-SCNC: 92 MMOL/L (ref 96–106)
CHLORIDE SERPL-SCNC: 96 MMOL/L (ref 96–106)
CHLORIDE SERPL-SCNC: 98 MMOL/L (ref 96–106)
CHOLEST SERPL-MCNC: 138 MG/DL (ref 100–199)
CHOLEST SERPL-MCNC: 139 MG/DL (ref 100–199)
CO2 SERPL-SCNC: 22 MMOL/L (ref 18–29)
CO2 SERPL-SCNC: 24 MMOL/L (ref 18–29)
CO2 SERPL-SCNC: 25 MMOL/L (ref 20–29)
CREAT SERPL-MCNC: 1.47 MG/DL (ref 0.76–1.27)
CREAT SERPL-MCNC: 1.56 MG/DL (ref 0.76–1.27)
CREAT SERPL-MCNC: 1.93 MG/DL (ref 0.76–1.27)
EOSINOPHIL # BLD AUTO: 0.1 X10E3/UL (ref 0–0.4)
EOSINOPHIL # BLD AUTO: 0.1 X10E3/UL (ref 0–0.4)
EOSINOPHIL NFR BLD AUTO: 1 %
EOSINOPHIL NFR BLD AUTO: 1 %
ERYTHROCYTE [DISTWIDTH] IN BLOOD BY AUTOMATED COUNT: 13.4 % (ref 12.3–15.4)
ERYTHROCYTE [DISTWIDTH] IN BLOOD BY AUTOMATED COUNT: 13.9 % (ref 12.3–15.4)
EST. AVERAGE GLUCOSE BLD GHB EST-MCNC: 128 MG/DL
EST. AVERAGE GLUCOSE BLD GHB EST-MCNC: 131 MG/DL
GFR SERPLBLD CREATININE-BSD FMLA CKD-EPI: 40 ML/MIN/1.73
GFR SERPLBLD CREATININE-BSD FMLA CKD-EPI: 43 ML/MIN/1.73
GFR SERPLBLD CREATININE-BSD FMLA CKD-EPI: 46 ML/MIN/1.73
GFR SERPLBLD CREATININE-BSD FMLA CKD-EPI: 50 ML/MIN/1.73
GLOBULIN SER CALC-MCNC: 2.4 G/DL (ref 1.5–4.5)
GLOBULIN SER CALC-MCNC: 2.4 G/DL (ref 1.5–4.5)
GLOBULIN SER CALC-MCNC: 2.9 G/DL (ref 1.5–4.5)
GLUCOSE SERPL-MCNC: 136 MG/DL (ref 65–99)
GLUCOSE SERPL-MCNC: 142 MG/DL (ref 65–99)
GLUCOSE SERPL-MCNC: 159 MG/DL (ref 65–99)
HBA1C MFR BLD: 6.1 % (ref 4.8–5.6)
HBA1C MFR BLD: 6.2 % (ref 4.8–5.6)
HCT VFR BLD AUTO: 37.5 % (ref 37.5–51)
HCT VFR BLD AUTO: 37.6 % (ref 37.5–51)
HDLC SERPL-MCNC: 41 MG/DL
HDLC SERPL-MCNC: 41 MG/DL
HGB BLD-MCNC: 12.8 G/DL (ref 13–17.7)
HGB BLD-MCNC: 13.3 G/DL (ref 13–17.7)
IMM GRANULOCYTES # BLD: 0 X10E3/UL (ref 0–0.1)
IMM GRANULOCYTES # BLD: 0 X10E3/UL (ref 0–0.1)
IMM GRANULOCYTES NFR BLD: 0 %
IMM GRANULOCYTES NFR BLD: 0 %
INTERPRETATION, 910389: NORMAL
INTERPRETATION, 910389: NORMAL
INTERPRETATION: NORMAL
LDLC SERPL CALC-MCNC: 73 MG/DL (ref 0–99)
LDLC SERPL CALC-MCNC: 75 MG/DL (ref 0–99)
LYMPHOCYTES # BLD AUTO: 0.9 X10E3/UL (ref 0.7–3.1)
LYMPHOCYTES # BLD AUTO: 1.2 X10E3/UL (ref 0.7–3.1)
LYMPHOCYTES NFR BLD AUTO: 18 %
LYMPHOCYTES NFR BLD AUTO: 21 %
MCH RBC QN AUTO: 30.1 PG (ref 26.6–33)
MCH RBC QN AUTO: 31.4 PG (ref 26.6–33)
MCHC RBC AUTO-ENTMCNC: 34.1 G/DL (ref 31.5–35.7)
MCHC RBC AUTO-ENTMCNC: 35.4 G/DL (ref 31.5–35.7)
MCV RBC AUTO: 88 FL (ref 79–97)
MCV RBC AUTO: 89 FL (ref 79–97)
MICROALBUMIN UR-MCNC: 7.7 UG/ML
MONOCYTES # BLD AUTO: 0.7 X10E3/UL (ref 0.1–0.9)
MONOCYTES # BLD AUTO: 0.9 X10E3/UL (ref 0.1–0.9)
MONOCYTES NFR BLD AUTO: 14 %
MONOCYTES NFR BLD AUTO: 16 %
NEUTROPHILS # BLD AUTO: 3.4 X10E3/UL (ref 1.4–7)
NEUTROPHILS # BLD AUTO: 3.4 X10E3/UL (ref 1.4–7)
NEUTROPHILS NFR BLD AUTO: 61 %
NEUTROPHILS NFR BLD AUTO: 67 %
PDF IMAGE, 910387: NORMAL
PDF IMAGE, 910387: NORMAL
PLATELET # BLD AUTO: 203 X10E3/UL (ref 150–379)
PLATELET # BLD AUTO: 222 X10E3/UL (ref 150–379)
POTASSIUM SERPL-SCNC: 3.9 MMOL/L (ref 3.5–5.2)
POTASSIUM SERPL-SCNC: 4.5 MMOL/L (ref 3.5–5.2)
POTASSIUM SERPL-SCNC: 4.5 MMOL/L (ref 3.5–5.2)
PROT SERPL-MCNC: 6.7 G/DL (ref 6–8.5)
PROT SERPL-MCNC: 6.9 G/DL (ref 6–8.5)
PROT SERPL-MCNC: 7.2 G/DL (ref 6–8.5)
RBC # BLD AUTO: 4.24 X10E6/UL (ref 4.14–5.8)
RBC # BLD AUTO: 4.25 X10E6/UL (ref 4.14–5.8)
SODIUM SERPL-SCNC: 138 MMOL/L (ref 134–144)
SODIUM SERPL-SCNC: 138 MMOL/L (ref 134–144)
SODIUM SERPL-SCNC: 139 MMOL/L (ref 134–144)
TRIGL SERPL-MCNC: 110 MG/DL (ref 0–149)
TRIGL SERPL-MCNC: 126 MG/DL (ref 0–149)
TSH SERPL DL<=0.005 MIU/L-ACNC: 2.94 UIU/ML (ref 0.45–4.5)
TSH SERPL DL<=0.005 MIU/L-ACNC: 3.34 UIU/ML (ref 0.45–4.5)
VLDLC SERPL CALC-MCNC: 22 MG/DL (ref 5–40)
VLDLC SERPL CALC-MCNC: 25 MG/DL (ref 5–40)
WBC # BLD AUTO: 5.1 X10E3/UL (ref 3.4–10.8)
WBC # BLD AUTO: 5.6 X10E3/UL (ref 3.4–10.8)

## 2018-01-01 RX ORDER — METFORMIN HYDROCHLORIDE 500 MG/1
500 TABLET ORAL 2 TIMES DAILY WITH MEALS
Qty: 180 TAB | Refills: 1 | Status: CANCELLED | OUTPATIENT
Start: 2018-01-01

## 2018-01-01 RX ORDER — MEGESTROL ACETATE 40 MG/1
40 TABLET ORAL DAILY
Qty: 30 TAB | Refills: 3 | Status: SHIPPED | OUTPATIENT
Start: 2018-01-01

## 2018-01-01 RX ORDER — ALBUTEROL SULFATE 2.5 MG/.5ML
SOLUTION RESPIRATORY (INHALATION)
COMMUNITY

## 2018-01-01 RX ORDER — SIMVASTATIN 40 MG/1
TABLET, FILM COATED ORAL
Qty: 90 TAB | Refills: 1 | Status: SHIPPED | OUTPATIENT
Start: 2018-01-01 | End: 2018-01-01

## 2018-01-01 RX ORDER — METFORMIN HYDROCHLORIDE 500 MG/1
500 TABLET ORAL 2 TIMES DAILY WITH MEALS
Qty: 180 TAB | Refills: 1 | Status: SHIPPED | OUTPATIENT
Start: 2018-01-01

## 2018-01-01 RX ORDER — FOLIC ACID 1 MG/1
TABLET ORAL DAILY
COMMUNITY

## 2018-01-01 RX ORDER — AMMONIUM LACTATE 12 G/100G
5 LOTION TOPICAL 2 TIMES DAILY
Qty: 450 G | Refills: 3 | Status: SHIPPED | OUTPATIENT
Start: 2018-01-01 | End: 2018-01-01

## 2018-01-01 RX ORDER — ADHESIVE BANDAGE
30 BANDAGE TOPICAL DAILY PRN
COMMUNITY

## 2018-01-01 RX ORDER — CARVEDILOL 3.12 MG/1
TABLET ORAL 2 TIMES DAILY WITH MEALS
COMMUNITY

## 2018-01-01 RX ORDER — PREDNISONE 10 MG/1
TABLET ORAL
Qty: 15 TAB | Refills: 0 | Status: SHIPPED | OUTPATIENT
Start: 2018-01-01 | End: 2018-01-01

## 2018-01-01 RX ORDER — DEXTROSE 40 %
1 GEL (GRAM) ORAL AS NEEDED
COMMUNITY

## 2018-01-01 RX ORDER — PREDNISONE 10 MG/1
TABLET ORAL
Qty: 15 TAB | Refills: 0 | Status: SHIPPED | OUTPATIENT
Start: 2018-01-01 | End: 2018-01-01 | Stop reason: SDUPTHER

## 2018-01-01 RX ORDER — DEXTROMETHORPHAN POLISTIREX 30 MG/5 ML
SUSPENSION, EXTENDED RELEASE 12 HR ORAL
COMMUNITY

## 2018-01-01 RX ORDER — MEGESTROL ACETATE 40 MG/ML
400 SUSPENSION ORAL DAILY
Qty: 150 ML | Refills: 1 | Status: SHIPPED | OUTPATIENT
Start: 2018-01-01 | End: 2018-01-01

## 2018-01-01 RX ORDER — OXYCODONE AND ACETAMINOPHEN 5; 325 MG/1; MG/1
TABLET ORAL
COMMUNITY

## 2018-01-01 RX ORDER — ALBUTEROL SULFATE 90 UG/1
2 AEROSOL, METERED RESPIRATORY (INHALATION)
Qty: 1 INHALER | Refills: 5 | Status: SHIPPED | OUTPATIENT
Start: 2018-01-01 | End: 2018-01-01

## 2018-01-01 RX ORDER — HYDROCORTISONE VALERATE 2 MG/G
CREAM TOPICAL 2 TIMES DAILY
Qty: 15 G | Refills: 1 | Status: SHIPPED | OUTPATIENT
Start: 2018-01-01

## 2018-01-01 RX ORDER — POLYETHYLENE GLYCOL 3350
POWDER (GRAM) MISCELLANEOUS DAILY
COMMUNITY

## 2018-01-01 RX ORDER — IPRATROPIUM BROMIDE 0.5 MG/2.5ML
0.5 SOLUTION RESPIRATORY (INHALATION)
COMMUNITY

## 2018-01-01 RX ORDER — DEXTROSE 50 % IN WATER (D50W) INTRAVENOUS SYRINGE
25 AS NEEDED
COMMUNITY

## 2018-01-01 RX ORDER — ACETAMINOPHEN 325 MG/1
650 TABLET ORAL
COMMUNITY

## 2018-01-01 RX ORDER — ALBUTEROL SULFATE 90 UG/1
2 AEROSOL, METERED RESPIRATORY (INHALATION)
Qty: 1 INHALER | Refills: 5 | Status: SHIPPED | OUTPATIENT
Start: 2018-01-01 | End: 2018-01-01 | Stop reason: SDUPTHER

## 2018-01-01 RX ORDER — FACIAL-BODY WIPES
10 EACH TOPICAL DAILY
COMMUNITY

## 2018-02-06 NOTE — MR AVS SNAPSHOT
Krista Repton 
 
 
 [address] [facility] [phone] Patient: Carlos Rob MRN: [id number] :10/31/1933 Visit Information Date & Time Provider Department Dept. Phone Encounter #  
 2018  1:20 PM Kristine Almanza  [address] [place] [phone] [id number] Upcoming Health Maintenance Date Due  
 EYE EXAM RETINAL OR DILATED Q1 10/31/1943 GLAUCOMA SCREENING Q2Y 10/31/1998 MICROALBUMIN Q1 2/3/2018 HEMOGLOBIN A1C Q6M 2018 FOOT EXAM Q1 2018 LIPID PANEL Q1 2018 MEDICARE YEARLY EXAM 2019 DTaP/Tdap/Td series (2 - Td) 2/3/2027 Allergies as of 2018  Review Complete On: 2018 By: Kristine Almanza MD  
  
 Severity Noted Reaction Type Reactions Aspirin Medium 2012   Side Effect Other (comments) \"makes stomach raw\" Current Immunizations  Never Reviewed Name Date Zoster Vaccine, Live 2/10/2017 Not reviewed this visit You Were Diagnosed With   
  
 Codes Comments Medicare annual wellness visit, subsequent    -  Primary ICD-10-CM: Z00.00 ICD-9-CM: V70.0 Controlled type 2 diabetes mellitus without complication, without long-term current use of insulin [facility]     ICD-10-CM: E11.9 ICD-9-CM: 250.00 Mixed hyperlipidemia     ICD-10-CM: E78.2 ICD-9-CM: 272.2 Coronary artery disease involving native coronary artery of native heart without angina pectoris     ICD-10-CM: I25.10 ICD-9-CM: 414.01 Vitals BP Pulse Temp Resp Height(growth percentile) Weight(growth percentile) 133/77 (BP 1 Location: Left arm, BP Patient Position: Sitting) 74 98.2 °F (36.8 °C) (Oral) 18 5' 10\" (1.778 m) 151 lb 9.6 oz (68.8 kg) SpO2 BMI Smoking Status 98% 21.75 kg/m2 Former Smoker Vitals History BMI and BSA Data Body Mass Index Body Surface Area 21.75 kg/m 2 1.84 m 2 Preferred Pharmacy Pharmacy Name Phone [facility], [place] - [address] [facility] [place] [address] [phone] Your Updated Medication List  
  
   
This list is accurate as of: 2/6/18  2:19 PM.  Always use your most recent med list.  
  
  
  
  
 ammonium lactate 12 % lotion Commonly known as:  LAC-HYDRIN Apply 5 g to affected area two (2) times a day for 90 days. rub in to affected area well  
  
 calcium 500 mg Tab Take  by mouth. cholecalciferol (vitamin d3) 400 unit Cap Commonly known as:  VITAMIN D3 Take 400 Units by mouth daily. Lancets Misc Use to check blood sugars on monitor  
  
 magnesium 250 mg Tab Take 1 Tab by mouth daily. metFORMIN 500 mg tablet Commonly known as:  GLUCOPHAGE Take 1 Tab by mouth two (2) times daily (with meals). OTHER  
  
 simvastatin 40 mg tablet Commonly known as:  ZOCOR  
TAKE 1 TABLET EVERY NIGHT We Performed the Following COLLECTION VENOUS BLOOD,VENIPUNCTURE N2578553 CPT(R)] HEMOGLOBIN A1C WITH EAG [34646 CPT(R)] LIPID PANEL [67492 CPT(R)] METABOLIC PANEL, COMPREHENSIVE [13872 CPT(R)] MICROALBUMIN, UR, RAND [id number] CPT(R)] Patient Instructions Medicare Wellness Visit, Male The best way to live healthy is to have a healthy lifestyle by eating a well-balanced diet, exercising regularly, limiting alcohol and stopping smoking. Regular physical exams and screening tests are another way to keep healthy. Preventive exams provided by your health care provider can find health problems before they become diseases or illnesses. Preventive services including immunizations, screening tests, monitoring and exams can help you take care of your own health. All people over age 72 should have a pneumovax  and and a prevnar shot to prevent pneumonia. These are once in a lifetime unless you and your provider decide differently. All people over 65 should have a yearly flu shot and a tetanus vaccine every 10 years. Screening for diabetes mellitus with a blood sugar test should be done every year. Glaucoma is a disease of the eye due to increased ocular pressure that can lead to blindness and it should be done every year by an eye professional. 
 
Cardiovascular screening tests that check for elevated lipids (fatty part of blood) which can lead to heart disease and strokes should be done every 5 years. Colorectal screening that evaluates for blood or polyps in your colon should be done yearly as a stool test or every five years as a flexible sigmoidoscope or every 10 years as a colonoscopy up to age 76. Men up to age 76 may need a screening blood test for prostate cancer at certain intervals, depending on their personal and family history. This decision is between the patient and his provider. If you have been a smoker or had family history of abdominal aortic aneurysms, you and your provider may decide to schedule an ultrasound test of your aorta. Hepatitis C screening is also recommended for anyone born between [birth year] through [address]. A shingles vaccine is also recommended once in a lifetime after age 61. Your Medicare Wellness Exam is recommended annually. Here is a list of your current Health Maintenance items with a due date: 
Health Maintenance Due Topic Date Due [facility] Eye Exam  10/31/1943  Glaucoma Screening   10/31/1998  Albumin Urine Test  02/03/2018  Hemoglobin A1C    02/18/2018 Introducing [facility]! Braxton Powell introduces [employer] patient portal. Now you can access parts of your medical record, email your doctor's office, and request medication refills online. 1. In your internet browser, go to [email or web address] 2. Click on the First Time User? Click Here link in the Sign In box. You will see the New Member Sign Up page. 3. Enter your BeSmart Access Code exactly as it appears below. You will not need to use this code after youve completed the sign-up process. If you do not sign up before the expiration date, you must request a new code. · BeSmart Access Code: [email or web address] Expires: 3/5/2018 10:11 AM 
 
4. Enter the last four digits of your Social Security Number (xxxx) and Date of Birth (mm/dd/yyyy) as indicated and click Submit. You will be taken to the next sign-up page. 5. Create a BeSmart ID. This will be your BeSmart login ID and cannot be changed, so think of one that is secure and easy to remember. 6. Create a BeSmart password. You can change your password at any time. 7. Enter your Password Reset Question and Answer. This can be used at a later time if you forget your password. 8. Enter your e-mail address. You will receive e-mail notification when new information is available in [address] 9. Click Sign Up. You can now view and download portions of your medical record. 10. Click the Download Summary menu link to download a portable copy of your medical information. If you have questions, please visit the Frequently Asked Questions section of the Be[employer] website. Remember, BeSmart is NOT to be used for urgent needs. For medical emergencies, dial 911. Now available from your iPhone and Android! Please provide this summary of care documentation to your next provider. Your primary care clinician is listed as Debe Nissen. If you have any questions after today's visit, please call [phone].

## 2018-02-06 NOTE — PROGRESS NOTES
This is a Subsequent Medicare Annual Wellness Exam (AWV) (Performed 12 months after IPPE or effective date of Medicare Part B enrollment)    I have reviewed the patient's medical history in detail and updated the computerized patient record. History     Quiana Salmeron is a 80 y.o. male who presents with the following:  Chief Complaint   Patient presents with    Annual Wellness Visit    Diabetes    Cholesterol Problem       Cholesterol Problem   The history is provided by the patient (Patient is having no difficulty with his simvastatin and having no muscle pain or weakness. ). Pertinent negatives include no chest pain, no abdominal pain, no headaches and no shortness of breath. Diabetes   The history is provided by the patient (Patient's diabetes is doing well on just metformin with blood sugars around 100 and no chest pain no peripheral neuropathy). Pertinent negatives include no chest pain, no abdominal pain, no headaches and no shortness of breath. Heart Problem    The history is provided by the patient (Patient's coronary artery disease has been silent with no chest pain or shortness of breath or dyspnea on exertion. He is having no trouble from his mitral regurgitation and aortic stenosis at this time. ). Pertinent negatives include no fever, no malaise/fatigue, no chest pain, no claudication, no orthopnea, no abdominal pain, no headaches, no dizziness, no cough and no shortness of breath. Allergies   Allergen Reactions    Aspirin Other (comments)     \"makes stomach raw\"       Current Outpatient Prescriptions   Medication Sig    simvastatin (ZOCOR) 40 mg tablet TAKE 1 TABLET EVERY NIGHT    cholecalciferol, vitamin d3, (VITAMIN D3) 400 unit cap Take 400 Units by mouth daily.  magnesium 250 mg tab Take 1 Tab by mouth daily.  metFORMIN (GLUCOPHAGE) 500 mg tablet Take 1 Tab by mouth two (2) times daily (with meals).     OTHER     Lancets misc Use to check blood sugars on monitor    calcium 500 mg Tab Take  by mouth.  ammonium lactate (LAC-HYDRIN) 12 % lotion Apply 5 g to affected area two (2) times a day for 90 days. rub in to affected area well     No current facility-administered medications for this visit. Past Medical History:   Diagnosis Date    Arthritis     hips    CAD (coronary artery disease) 2008    quintuple bypass-Savage    Calculus of kidney     Cancer ([facility] [address]     skin cancers (removed)    Diabetes ([facility] [address] 1990's    Type II    GERD (gastroesophageal reflux disease)     Hypercholesterolemia     Other ill-defined conditions(799.89)     elevated cholesterol       Past Surgical History:   Procedure Laterality Date    CARDIAC SURG PROCEDURE UNLIST  2008    quintuple bypass-Wofgang    COLONOSCOPY      HX CHOLECYSTECTOMY      HX ORTHOPAEDIC  1996 & 2009    hip replacement- bilateral    HX UROLOGICAL  1965    kidney stone extraction       Family History   Problem Relation Age of Onset    Diabetes Mother     Heart Disease Father     Heart Disease Brother        Social History     Social History    Marital status:      Spouse name: N/A    Number of children: N/A    Years of education: N/A     Social History Main Topics    Smoking status: Former Smoker    Smokeless tobacco: Never Used      Comment: quit smoking 1954    Alcohol use No    Drug use: No    Sexual activity: Not Asked     Other Topics Concern    None     Social History Narrative       Review of Systems   Constitutional: Negative for chills, fever, malaise/fatigue and weight loss. HENT: Negative for congestion, hearing loss, sore throat and tinnitus. Eyes: Negative for blurred vision, pain and discharge. Respiratory: Negative for cough, shortness of breath and wheezing. Cardiovascular: Negative for chest pain, palpitations, orthopnea, claudication and leg swelling. Gastrointestinal: Negative for abdominal pain, constipation and heartburn.    Genitourinary: Negative for dysuria, frequency and urgency. Musculoskeletal: Negative for falls, joint pain and myalgias. Skin: Negative for itching and rash. Neurological: Negative for dizziness, tingling, tremors and headaches. Endo/Heme/Allergies: Negative for environmental allergies and polydipsia. Psychiatric/Behavioral: Negative for depression and substance abuse. The patient is not nervous/anxious. Visit Vitals    /77 (BP 1 Location: Left arm, BP Patient Position: Sitting)    Pulse 74    Temp 98.2 °F (36.8 °C) (Oral)    Resp 18    Ht 5' 10\" (1.778 m)    Wt 151 lb 9.6 oz (68.8 kg)    SpO2 98%    BMI 21.75 kg/m2     Physical Exam   Constitutional: He is oriented to person, place, and time and well-developed, well-nourished, and in no distress. HENT:   Head: Normocephalic and atraumatic. Nose: Nose normal.   Mouth/Throat: Oropharynx is clear and moist.   Eyes: Conjunctivae and EOM are normal. Pupils are equal, round, and reactive to light. Neck: Normal range of motion. Neck supple. No JVD present. No tracheal deviation present. No thyromegaly present. Cardiovascular: Normal rate, regular rhythm, normal heart sounds and intact distal pulses. Exam reveals no gallop and no friction rub. No murmur heard. Pulmonary/Chest: Effort normal and breath sounds normal. No respiratory distress. He has no wheezes. He has no rales. He exhibits no tenderness. Abdominal: Soft. Bowel sounds are normal. He exhibits no distension and no mass. There is no tenderness. There is no rebound and no guarding. Musculoskeletal: Normal range of motion. He exhibits no edema or tenderness. Lymphadenopathy:     He has no cervical adenopathy. Neurological: He is alert and oriented to person, place, and time. He has normal reflexes. No cranial nerve deficit. He exhibits normal muscle tone. Gait normal. Coordination normal.   Skin: Skin is warm and dry. No rash noted. No erythema.    Psychiatric: Mood, memory, affect and judgment normal.   Vitals reviewed. ICD-10-CM ICD-9-CM    1. Medicare annual wellness visit, subsequent Z00.00 V70.0 COLLECTION VENOUS BLOOD,VENIPUNCTURE      METABOLIC PANEL, COMPREHENSIVE      LIPID PANEL   2. Controlled type 2 diabetes mellitus without complication, without long-term current use of insulin (HCC) E11.9 250.00 MICROALBUMIN, UR, RAND      HEMOGLOBIN A1C WITH EAG      COLLECTION VENOUS BLOOD,VENIPUNCTURE      METABOLIC PANEL, COMPREHENSIVE      LIPID PANEL   3. Mixed hyperlipidemia E78.2 272.2 COLLECTION VENOUS BLOOD,VENIPUNCTURE      METABOLIC PANEL, COMPREHENSIVE      LIPID PANEL       Orders Placed This Encounter    MICROALBUMIN, UR, RAND    HEMOGLOBIN A1C WITH EAG    METABOLIC PANEL, COMPREHENSIVE    LIPID PANEL    COLLECTION VENOUS BLOOD,VENIPUNCTURE       Follow-up Disposition: Not on File    SELENE Benavides MD          Past Medical History:   Diagnosis Date    Arthritis     hips    CAD (coronary artery disease) 2008    quintuple bypass-Savage    Calculus of kidney     Cancer ([facility] [address]     skin cancers (removed)    Diabetes ([facility] [address] 1990's    Type II    GERD (gastroesophageal reflux disease)     Hypercholesterolemia     Other ill-defined conditions(799.89)     elevated cholesterol      Past Surgical History:   Procedure Laterality Date    CARDIAC SURG PROCEDURE UNLIST  2008    quintuple bypass-Wofgang    COLONOSCOPY      HX CHOLECYSTECTOMY      HX ORTHOPAEDIC  1996 & 2009    hip replacement- bilateral    HX UROLOGICAL  1965    kidney stone extraction     Current Outpatient Prescriptions   Medication Sig Dispense Refill    simvastatin (ZOCOR) 40 mg tablet TAKE 1 TABLET EVERY NIGHT 90 Tab 1    cholecalciferol, vitamin d3, (VITAMIN D3) 400 unit cap Take 400 Units by mouth daily. 90 Cap 1    magnesium 250 mg tab Take 1 Tab by mouth daily. 90 Tab 1    metFORMIN (GLUCOPHAGE) 500 mg tablet Take 1 Tab by mouth two (2) times daily (with meals).  180 Tab 1    OTHER       Lancets misc Use to check blood sugars on monitor 1 Package 2    calcium 500 mg Tab Take  by mouth.  ammonium lactate (LAC-HYDRIN) 12 % lotion Apply 5 g to affected area two (2) times a day for 90 days. rub in to affected area well 450 g 3     Allergies   Allergen Reactions    Aspirin Other (comments)     \"makes stomach raw\"     Family History   Problem Relation Age of Onset    Diabetes Mother     Heart Disease Father     Heart Disease Brother      Social History   Substance Use Topics    Smoking status: Former Smoker    Smokeless tobacco: Never Used      Comment: quit smoking 1954    Alcohol use No     Patient Active Problem List   Diagnosis Code    Diabetes mellitus type 2, controlled ([facility] [address] E11.9    Hyperlipidemia E78.5    CAD (coronary artery disease) I25.10    SCC (squamous cell carcinoma) C44.92    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    History of cholecystectomy Z90.49    Actinic keratoses L57.0    Dry skin dermatitis L85.3    Controlled type 2 diabetes mellitus without complication, without long-term current use of insulin (HCC) E11.9    Hypomagnesemia E83.42    Hypovitaminosis D E55.9    Mitral regurgitation and aortic stenosis I08.0       Depression Risk Factor Screening:     PHQ over the last two weeks 2/6/2018   PHQ Not Done -   Little interest or pleasure in doing things Not at all   Feeling down, depressed or hopeless Not at all   Total Score PHQ 2 0     Alcohol Risk Factor Screening: You do not drink alcohol or very rarely. Functional Ability and Level of Safety:   Hearing Loss  The patient needs further evaluation. Activities of Daily Living  The home contains: handrails and grab bars  Patient does total self care    Fall Risk  Fall Risk Assessment, last 12 mths 2/6/2018   Able to walk? Yes   Fall in past 12 months?  No   Number of falls in past 12 months -       Abuse Screen  Patient is not abused    Cognitive Screening   Evaluation of Cognitive Function:  Has your family/caregiver stated any concerns about your memory: no  Normal, Clock Drawing test    Patient Care Team   Patient Care Team:  Cecy Alicea MD as PCP - General (Family Practice)    Assessment/Plan   Education and counseling provided:  Are appropriate based on today's review and evaluation  End-of-Life planning (with patient's consent)  Pneumococcal Vaccine  Influenza Vaccine  Screening for glaucoma    Functional Ability:   Does the patient exhibit a steady gait? yes   How long did it take the patient to get up and walk from a sitting position? 3 seconds   Is the patient self reliant?  (ie can do own laundry, meals, household chores)  yes     Does the patient handle his/her own medications? yes     Does the patient handle his/her own money? yes     Is the patients home safe (ie good lighting, handrails on stairs and bath, etc.)? yes     Did you notice or did patient express any hearing difficulties? yes     Did you notice or did patient express any vision difficulties?   no     Were distance and reading eye charts used? no       Advance Care Planning:   Patient was offered the opportunity to discuss advance care planning:  yes     Does patient have an Advance Directive:  no   If no, did you provide information on Caring Connections?   yes     ADL Assessment 2/3/2017   Feeding yourself No Help Needed   Getting from bed to chair No Help Needed   Getting dressed No Help Needed   Bathing or showering No Help Needed   Walk across the room (includes cane/walker) No Help Needed   Using the telphone No Help Needed   Taking your medications No Help Needed   Preparing meals No Help Needed   Managing money (expenses/bills) No Help Needed   Moderately strenuous housework (laundry) No Help Needed   Shopping for personal items (toiletries/medicines) No Help Needed   Shopping for groceries No Help Needed   Driving No Help Needed   Climbing a flight of stairs No Help Needed   Getting to places beyond walking distances No Help Needed           Health Maintenance Due   Topic Date Due    EYE EXAM RETINAL OR DILATED Q1  10/31/1943    GLAUCOMA SCREENING Q2Y  10/31/1998    MICROALBUMIN Q1  02/03/2018    HEMOGLOBIN A1C Q6M  02/18/2018   KENNY Benavides MD

## 2018-02-06 NOTE — PATIENT INSTRUCTIONS

## 2018-04-16 NOTE — TELEPHONE ENCOUNTER
Pt daughter called, pt is needing rx for hearing test sent to Audiology for 4/30 appointment     Fax # [phone]

## 2018-05-09 NOTE — PROGRESS NOTES
Wilver Morales is a 80 y.o. male who presents with the following:  Chief Complaint   Patient presents with    Coronary Artery Disease    Diabetes    Cholesterol Problem    Weight Loss       Diabetes   The history is provided by the patient (Patient is doing well without chest pain or shortness of breath nor any polyuria or polydipsia but he is noticing that he still losing weight slowly and steadily. ). Pertinent negatives include no chest pain, no abdominal pain, no headaches and no shortness of breath. Cholesterol Problem   The history is provided by the patient (And is tolerating his simvastatin without any muscle pain or weakness. ). Pertinent negatives include no chest pain, no abdominal pain, no headaches and no shortness of breath. Weight Loss   The history is provided by the patient (Patient is actually at the same weight that he was in December but he is also complaining of fatigue. He is having no chest pain nor orthopnea and is staying very active. ). Pertinent negatives include no chest pain, no abdominal pain, no headaches and no shortness of breath. Memory Loss    The history is provided by the patient (Patient is having a little more problems with forgetfulness and occasionally he confuses names. ). Associated symptoms include confusion. Pertinent negatives include no somnolence, no seizures, no unresponsiveness, no weakness, no agitation, no delusions, no hallucinations, no self-injury, no violence, no tingling and no numbness. Skin Problem   The history is provided by the patient (Patient continues to have difficulty with dry skin but does state that he ammonium lactate lotion seems to help quite well. Is not getting too much sting from the lotion. ). Pertinent negatives include no chest pain, no abdominal pain, no headaches and no shortness of breath. Heart Problem    The history is provided by the patient (pt w/o problem with chest pain nor GARCIA post CABG).  Pertinent negatives include no numbness, no chest pain, no abdominal pain, no headaches, no weakness, no cough and no shortness of breath. Allergies   Allergen Reactions    Aspirin Other (comments)     \"makes stomach raw\"       Current Outpatient Prescriptions   Medication Sig    simvastatin (ZOCOR) 40 mg tablet TAKE 1 TABLET EVERY NIGHT    metFORMIN (GLUCOPHAGE) 500 mg tablet Take 1 Tab by mouth two (2) times daily (with meals).  ammonium lactate (LAC-HYDRIN) 12 % lotion Apply 5 g to affected area two (2) times a day for 90 days. rub in to affected area well    cholecalciferol, vitamin d3, (VITAMIN D3) 400 unit cap Take 400 Units by mouth daily.  magnesium 250 mg tab Take 1 Tab by mouth daily.  OTHER     Lancets misc Use to check blood sugars on monitor    calcium 500 mg Tab Take  by mouth. No current facility-administered medications for this visit.         Past Medical History:   Diagnosis Date    Arthritis     hips    CAD (coronary artery disease) 2008    quintuple bypass-Savage    Calculus of kidney     Cancer ([facility] [address]     skin cancers (removed)    Diabetes ([facility] [address] 1990's    Type II    GERD (gastroesophageal reflux disease)     Hypercholesterolemia     Other ill-defined conditions(799.89)     elevated cholesterol       Past Surgical History:   Procedure Laterality Date    CARDIAC SURG PROCEDURE UNLIST  2008    quintuple bypass-Wofgang    COLONOSCOPY      HX CHOLECYSTECTOMY      HX ORTHOPAEDIC  1996 & 2009    hip replacement- bilateral    HX UROLOGICAL  1965    kidney stone extraction       Family History   Problem Relation Age of Onset    Diabetes Mother     Heart Disease Father     Heart Disease Brother        Social History     Social History    Marital status:      Spouse name: N/A    Number of children: N/A    Years of education: N/A     Social History Main Topics    Smoking status: Former Smoker    Smokeless tobacco: Never Used      Comment: quit smoking 1954    Alcohol use No    Drug use: No    Sexual activity: Not Asked     Other Topics Concern    None     Social History Narrative       Review of Systems   HENT: Positive for congestion (She states the pollen is giving him fits but he still works outside. ). Respiratory: Negative for cough and shortness of breath. Cardiovascular: Negative for chest pain. Gastrointestinal: Negative for abdominal pain. Neurological: Negative for tingling, seizures, weakness, numbness and headaches. Psychiatric/Behavioral: Positive for confusion and memory loss. Negative for agitation, hallucinations and self-injury. Visit Vitals    /76 (BP 1 Location: Left arm, BP Patient Position: Sitting)    Pulse (!) 57    Resp 18    Ht 5' 10\" (1.778 m)    Wt 146 lb (66.2 kg)    SpO2 97%    BMI 20.95 kg/m2     Physical Exam   Constitutional: He is oriented to person, place, and time and well-developed, well-nourished, and in no distress. HENT:   Head: Normocephalic and atraumatic. Nose: Nose normal.   Mouth/Throat: Oropharynx is clear and moist.   Eyes: Conjunctivae and EOM are normal. Pupils are equal, round, and reactive to light. Neck: Normal range of motion. Neck supple. No JVD present. No tracheal deviation present. No thyromegaly present. Cardiovascular: Normal rate, regular rhythm, normal heart sounds and intact distal pulses. Exam reveals no gallop and no friction rub. No murmur heard. Pulmonary/Chest: Effort normal and breath sounds normal. No respiratory distress. He has no wheezes. He has no rales. He exhibits no tenderness. Abdominal: Soft. Bowel sounds are normal. He exhibits no distension and no mass. There is no tenderness. There is no rebound and no guarding. Musculoskeletal: Normal range of motion. He exhibits no edema or tenderness. Lymphadenopathy:     He has no cervical adenopathy. Neurological: He is alert and oriented to person, place, and time. He has normal reflexes. No cranial nerve deficit.  He exhibits normal muscle tone. Gait normal. Coordination normal.   Skin: Skin is warm and dry. No rash noted. No erythema. Psychiatric: Mood, affect and judgment normal.   Vitals reviewed. ICD-10-CM ICD-9-CM    1. Controlled type 2 diabetes mellitus without complication, without long-term current use of insulin ([facility]) E11.9 250.00 HEMOGLOBIN A1C WITH EAG      METABOLIC PANEL, COMPREHENSIVE      LIPID PANEL      simvastatin (ZOCOR) 40 mg tablet      metFORMIN (GLUCOPHAGE) 500 mg tablet   2. Mixed hyperlipidemia E78.2 272.2 CBC WITH AUTOMATED DIFF      simvastatin (ZOCOR) 40 mg tablet   3. Coronary artery disease involving native coronary artery of native heart without angina pectoris I25.10 414.01 simvastatin (ZOCOR) 40 mg tablet   4. Mitral regurgitation and aortic stenosis I08.0 396.2 CBC WITH AUTOMATED DIFF   5. Abnormal weight loss R63.4 783.21 CBC WITH AUTOMATED DIFF      TSH 3RD GENERATION   6. Hypovitaminosis D E55.9 268.9 VITAMIN D, 1, 25 DIHYDROXY   7. Pruritic dermatitis L29.9 698.9 ammonium lactate (LAC-HYDRIN) 12 % lotion   8. Xerosis of skin L85.3 706.8 ammonium lactate (LAC-HYDRIN) 12 % lotion       Orders Placed This Encounter    HEMOGLOBIN A1C WITH EAG    METABOLIC PANEL, COMPREHENSIVE    LIPID PANEL    VITAMIN D, 1, 25 DIHYDROXY    CBC WITH AUTOMATED DIFF    TSH 3RD GENERATION    simvastatin (ZOCOR) 40 mg tablet     Sig: TAKE 1 TABLET EVERY NIGHT     Dispense:  90 Tab     Refill:  1    metFORMIN (GLUCOPHAGE) 500 mg tablet     Sig: Take 1 Tab by mouth two (2) times daily (with meals). Dispense:  180 Tab     Refill:  1    ammonium lactate (LAC-HYDRIN) 12 % lotion     Sig: Apply 5 g to affected area two (2) times a day for 90 days.  rub in to affected area well     Dispense:  450 g     Refill:  3       Follow-up Disposition: Not on Aimee MD Augie

## 2018-05-09 NOTE — MR AVS SNAPSHOT
[address] [place] [address] [phone] Patient: Alfredo Frazier MRN: [id number] :10/31/1933 Visit Information Date & Time Provider Department Dept. Phone Encounter #  
 2018  1:00 PM Hugh Mclain MD [address] [place] [address] [id number] Upcoming Health Maintenance Date Due  
 EYE EXAM RETINAL OR DILATED Q1 10/31/1943 GLAUCOMA SCREENING Q2Y 10/31/1998 Influenza Age 5 to Adult 2018 HEMOGLOBIN A1C Q6M 2018 FOOT EXAM Q1 2018 MICROALBUMIN Q1 2019 LIPID PANEL Q1 2019 MEDICARE YEARLY EXAM 2019 DTaP/Tdap/Td series (2 - Td) 2/3/2027 Allergies as of 2018  Review Complete On: 2018 By: Prem Harding LPN Severity Noted Reaction Type Reactions Aspirin Medium 2012   Side Effect Other (comments) \"makes stomach raw\" Current Immunizations  Never Reviewed Name Date Zoster Vaccine, Live 2/10/2017 Not reviewed this visit Vitals BP Pulse Resp Height(growth percentile) Weight(growth percentile) SpO2  
 119/76 (BP 1 Location: Left arm, BP Patient Position: Sitting) (!) 57 18 5' 10\" (1.778 m) 146 lb (66.2 kg) 97% BMI Smoking Status 20.95 kg/m2 Former Smoker Vitals History BMI and BSA Data Body Mass Index Body Surface Area  
 20.95 kg/m 2 1.81 m 2 Preferred Pharmacy Pharmacy Name Phone [address] [phone] Your Updated Medication List  
  
   
This list is accurate as of 18  1:29 PM.  Always use your most recent med list.  
  
  
  
  
 ammonium lactate 12 % lotion Commonly known as:  LAC-HYDRIN Apply 5 g to affected area two (2) times a day for 90 days. rub in to affected area well  
  
 calcium 500 mg Tab Take  by mouth. cholecalciferol (vitamin d3) 400 unit Cap Commonly known as:  VITAMIN D3 Take 400 Units by mouth daily. Lancets Misc Use to check blood sugars on monitor  
  
 magnesium 250 mg Tab Take 1 Tab by mouth daily. metFORMIN 500 mg tablet Commonly known as:  GLUCOPHAGE Take 1 Tab by mouth two (2) times daily (with meals). OTHER  
  
 simvastatin 40 mg tablet Commonly known as:  ZOCOR  
TAKE 1 TABLET EVERY NIGHT Introducing [facility] & HEALTH SERVICES! [facility] introduces [employer] patient portal. Now you can access parts of your medical record, email your doctor's office, and request medication refills online. 1. In your internet browser, go to [email or web address] 2. Click on the First Time User? Click Here link in the Sign In box. You will see the New Member Sign Up page. 3. Enter your [employer] Access Code exactly as it appears below. You will not need to use this code after youve completed the sign-up process. If you do not sign up before the expiration date, you must request a new code. · [employer] Access Code: [email or web address] Expires: 7/29/2018  4:16 PM 
 
4. Enter the last four digits of your Social Security Number (xxxx) and Date of Birth (mm/dd/yyyy) as indicated and click Submit. You will be taken to the next sign-up page. 5. Create a [employer] ID. This will be your [employer] login ID and cannot be changed, so think of one that is secure and easy to remember. 6. Create a [employer] password. You can change your password at any time. 7. Enter your Password Reset Question and Answer. This can be used at a later time if you forget your password. 8. Enter your e-mail address. You will receive e-mail notification when new information is available in [address] 9. Click Sign Up. You can now view and download portions of your medical record. 10. Click the Download Summary menu link to download a portable copy of your medical information. If you have questions, please visit the Frequently Asked Questions section of the [employer] website. Remember, [employer] is NOT to be used for urgent needs. For medical emergencies, dial 911. Now available from your iPhone and Android! Please provide this summary of care documentation to your next provider. Your primary care clinician is listed as Corine Cantu. If you have any questions after today's visit, please call [phone].

## 2018-05-09 NOTE — PROGRESS NOTES
1. Have you been to the ER, urgent care clinic since your last visit? Hospitalized since your last visit? No    2. Have you seen or consulted any other health care providers outside of the [facility] since your last visit? Include any pap smears or colon screening.  No

## 2018-07-02 NOTE — PROGRESS NOTES
Radha Botello is a 80 y.o. male who presents with the following:  Chief Complaint   Patient presents with    Wheezing    Fatigue    Skin Problem    Weight Loss     Poor appetite       Wheezing    The history is provided by the patient (Patient complains of wheezing especially when he lays down at night and during the day with his cough seems somewhat wheezy and he feels tight in his chest.  Denies any chest pain. ). Associated symptoms include cough. Pertinent negatives include no chest pain, no fever, no abdominal pain, no dysuria, no coryza, no headaches, no rhinorrhea, no sore throat, no swollen glands and no rash. Fatigue   The history is provided by the patient (Patient is feeling chronically fatigued and just has no energy is associated with decreased appetite and weight loss. ). Pertinent negatives include no chest pain, no abdominal pain, no headaches and no shortness of breath. Skin Problem   The history is provided by the patient (Patient is been having dry skin which is exceedingly itchy which irritates him a great deal and has been helped with ammonium lactate but not the itching. ). Pertinent negatives include no chest pain, no abdominal pain, no headaches and no shortness of breath. Weight Loss   The history is provided by the patient (Patient has been having persisting weight loss because of his decreased appetite and is associated with some of his osteoarthritis symptoms and constipation from lack of activity. ). Pertinent negatives include no chest pain, no abdominal pain, no headaches and no shortness of breath. Allergies   Allergen Reactions    Aspirin Other (comments)     \"makes stomach raw\"       Current Outpatient Prescriptions   Medication Sig    simvastatin (ZOCOR) 40 mg tablet TAKE 1 TABLET EVERY NIGHT    metFORMIN (GLUCOPHAGE) 500 mg tablet Take 1 Tab by mouth two (2) times daily (with meals).     ammonium lactate (LAC-HYDRIN) 12 % lotion Apply 5 g to affected area two (2) times a day for 90 days. rub in to affected area well    cholecalciferol, vitamin d3, (VITAMIN D3) 400 unit cap Take 400 Units by mouth daily.  magnesium 250 mg tab Take 1 Tab by mouth daily.  OTHER     Lancets misc Use to check blood sugars on monitor    calcium 500 mg Tab Take  by mouth. No current facility-administered medications for this visit. Past Medical History:   Diagnosis Date    Arthritis     hips    CAD (coronary artery disease) 2008    quintuple bypass-[place]    Calculus of kidney     Cancer ([facility] [address]     skin cancers (removed)    Diabetes ([facility] [address] [facility] 1990's    Type II    GERD (gastroesophageal reflux disease)     Hypercholesterolemia     Other ill-defined conditions(799.89)     elevated cholesterol       Past Surgical History:   Procedure Laterality Date    CARDIAC SURG PROCEDURE UNLIST  2008    quintuple bypass-[place]    COLONOSCOPY      HX CHOLECYSTECTOMY      HX ORTHOPAEDIC  1996 & 2009    hip replacement- bilateral    HX UROLOGICAL  1965    kidney stone extraction       Family History   Problem Relation Age of Onset    Diabetes Mother     Heart Disease Father     Heart Disease Brother        Social History     Social History    Marital status:      Spouse name: N/A    Number of children: N/A    Years of education: N/A     Social History Main Topics    Smoking status: Former Smoker    Smokeless tobacco: Never Used      Comment: quit smoking 1954    Alcohol use No    Drug use: No    Sexual activity: Not Asked     Other Topics Concern    None     Social History Narrative       Review of Systems   Constitutional: Positive for fatigue and malaise/fatigue. Negative for chills, fever and weight loss. HENT: Negative for congestion, hearing loss, rhinorrhea, sore throat and tinnitus. Eyes: Negative for blurred vision, pain and discharge. Respiratory: Positive for cough and wheezing. Negative for shortness of breath.     Cardiovascular: Negative for chest pain, palpitations, orthopnea, claudication and leg swelling. Patient's dyslipidemia is doing well on the simvastatin without muscle pain or weakness   Gastrointestinal: Positive for constipation. Negative for abdominal pain, blood in stool, heartburn, melena and nausea. Genitourinary: Negative for dysuria, frequency and urgency. Musculoskeletal: Negative for falls, joint pain and myalgias. Skin: Positive for itching. Negative for rash. Neurological: Negative for dizziness, tingling, tremors and headaches. Endo/Heme/Allergies: Negative for environmental allergies and polydipsia. Does not bruise/bleed easily. Psychiatric/Behavioral: Negative for depression and substance abuse. The patient is not nervous/anxious. Visit Vitals    /88 (BP 1 Location: Left arm, BP Patient Position: Sitting)    Pulse 94    Temp 95.6 °F (35.3 °C) (Oral)    Resp 18    Ht 5' 10\" (1.778 m)    Wt 136 lb 6.4 oz (61.9 kg)    SpO2 98%    BMI 19.57 kg/m2     Physical Exam   Constitutional: He is oriented to person, place, and time and well-developed, well-nourished, and in no distress. HENT:   Head: Normocephalic and atraumatic. Nose: Nose normal.   Mouth/Throat: Oropharynx is clear and moist.   Eyes: Conjunctivae and EOM are normal. Pupils are equal, round, and reactive to light. Neck: Normal range of motion. Neck supple. No JVD present. No tracheal deviation present. No thyromegaly present. Cardiovascular: Normal rate, regular rhythm, normal heart sounds and intact distal pulses. Exam reveals no gallop and no friction rub. No murmur heard. Pulmonary/Chest: Effort normal and breath sounds normal. No respiratory distress. He has no wheezes. He has no rales. He exhibits no tenderness. Abdominal: Soft. Bowel sounds are normal. He exhibits no distension and no mass. There is no tenderness. There is no rebound and no guarding. Musculoskeletal: Normal range of motion.  He exhibits no edema or tenderness. Lymphadenopathy:     He has no cervical adenopathy. Neurological: He is alert and oriented to person, place, and time. He has normal reflexes. No cranial nerve deficit. He exhibits normal muscle tone. Gait normal. Coordination normal.   Skin: Skin is warm and dry. No rash noted. No erythema. Patient's skin is excessively dry   Psychiatric: Mood, memory, affect and judgment normal.   Vitals reviewed. ICD-10-CM ICD-9-CM    1. Dry skin dermatitis L85.3 692.89 COLLECTION VENOUS BLOOD,VENIPUNCTURE      METABOLIC PANEL, COMPREHENSIVE      TSH 3RD GENERATION   2. Chronic fatigue R53.82 780.79 COLLECTION VENOUS BLOOD,VENIPUNCTURE      METABOLIC PANEL, COMPREHENSIVE      TSH 3RD GENERATION      CBC WITH AUTOMATED DIFF   3. Weight loss R63.4 783.21 COLLECTION VENOUS BLOOD,VENIPUNCTURE      METABOLIC PANEL, COMPREHENSIVE      TSH 3RD GENERATION      CBC WITH AUTOMATED DIFF   4. Poor appetite R63.0 783.0 COLLECTION VENOUS BLOOD,VENIPUNCTURE      METABOLIC PANEL, COMPREHENSIVE      TSH 3RD GENERATION      CBC WITH AUTOMATED DIFF   5.  Dyslipidemia E78.5 272.4 CBC WITH AUTOMATED DIFF       Orders Placed This Encounter    COLLECTION VENOUS BLOOD,VENIPUNCTURE    METABOLIC PANEL, COMPREHENSIVE    TSH 3RD GENERATION    CBC WITH AUTOMATED DIFF       Follow-up Disposition: Not on Bandar Lopez MD

## 2018-07-02 NOTE — MR AVS SNAPSHOT
[address] [phone] Patient: Suri Vee MRN: [id number] :10/31/1933 Visit Information Date & Time Provider Department Dept. Phone Encounter #  
 2018  3:00 PM Victor Manuel Wright  [address] [facility] [phone] [id number] Your Appointments 2018  1:00 PM  
Follow Up with Victor Manuel Wright MD  
[facility] [address] ([address]) Appt Note: 3 month f/u  
 [address] [facility]  
[phone] [address] [facility] Upcoming Health Maintenance Date Due  
 EYE EXAM RETINAL OR DILATED Q1 [birth year]31[birth year]1943 GLAUCOMA SCREENING Q2Y 10/31/1998 Influenza Age 5 to Adult 2018 FOOT EXAM Q1 2018 HEMOGLOBIN A1C Q6M 2018 MICROALBUMIN Q1 2019 MEDICARE YEARLY EXAM 2019 LIPID PANEL Q1 2019 DTaP/Tdap/Td series (2 - Td) 2/3/2027 Allergies as of 2018  Review Complete On: 2018 By: Victor Manuel Wright MD  
  
 Severity Noted Reaction Type Reactions Aspirin Medium 2012   Side Effect Other (comments) \"makes stomach raw\" Current Immunizations  Never Reviewed Name Date Zoster Vaccine, Live 2/10/2017 Not reviewed this visit You Were Diagnosed With   
  
 Codes Comments Dry skin dermatitis    -  Primary ICD-10-CM: L85.3 ICD-9-CM: 692.89 Chronic fatigue     ICD-10-CM: R53.82 
ICD-9-CM: 780.79 Weight loss     ICD-10-CM: R63.4 ICD-9-CM: 783.21 Poor appetite     ICD-10-CM: R63.0 ICD-9-CM: 826. 0 Dyslipidemia     ICD-10-CM: E78.5 ICD-9-CM: 272.4 Vitals BP Pulse Temp Resp Height(growth percentile) Weight(growth percentile) 105/88 (BP 1 Location: Left arm, BP Patient Position: Sitting) 94 95.6 °F (35.3 °C) (Oral) 18 5' 10\" (1.778 m) 136 lb 6.4 oz (61.9 kg) SpO2 BMI Smoking Status 98% 19.57 kg/m2 Former Smoker Vitals History BMI and BSA Data Body Mass Index Body Surface Area  
 19.57 kg/m 2 1.75 m 2 Preferred Pharmacy Pharmacy Name Phone [facility] [place] [facility] [address] [facility] [address] [place] [address] [phone] Your Updated Medication List  
  
   
This list is accurate as of 7/2/18  4:03 PM.  Always use your most recent med list.  
  
  
  
  
 ammonium lactate 12 % lotion Commonly known as:  LAC-HYDRIN Apply 5 g to affected area two (2) times a day for 90 days. rub in to affected area well  
  
 calcium 500 mg Tab Take  by mouth. cholecalciferol (vitamin d3) 400 unit Cap Commonly known as:  VITAMIN D3 Take 400 Units by mouth daily. Lancets Misc Use to check blood sugars on monitor  
  
 magnesium 250 mg Tab Take 1 Tab by mouth daily. metFORMIN 500 mg tablet Commonly known as:  GLUCOPHAGE Take 1 Tab by mouth two (2) times daily (with meals). OTHER  
  
 simvastatin 40 mg tablet Commonly known as:  ZOCOR  
TAKE 1 TABLET EVERY NIGHT Introducing [facility] & [facility]! [address] [facility] introduces [employer] patient portal. Now you can access parts of your medical record, email your doctor's office, and request medication refills online. 1. In your internet browser, go to [email or web address] 2. Click on the First Time User? Click Here link in the Sign In box. You will see the New Member Sign Up page. 3. Enter your [employer] Access Code exactly as it appears below. You will not need to use this code after youve completed the sign-up process. If you do not sign up before the expiration date, you must request a new code. · [employer] Access Code: [email or web address] Expires: 7/29/2018  4:16 PM 
 
4. Enter the last four digits of your Social Security Number (xxxx) and Date of Birth (mm/dd/yyyy) as indicated and click Submit. You will be taken to the next sign-up page. 5. Create a [employer] ID. This will be your [employer] login ID and cannot be changed, so think of one that is secure and easy to remember. 6. Create a [employer] password. You can change your password at any time. 7. Enter your Password Reset Question and Answer. This can be used at a later time if you forget your password. 8. Enter your e-mail address. You will receive e-mail notification when new information is available in [address] 9. Click Sign Up. You can now view and download portions of your medical record. 10. Click the Download Summary menu link to download a portable copy of your medical information. If you have questions, please visit the Frequently Asked Questions section of the [employer] website. Remember, [employer] is NOT to be used for urgent needs. For medical emergencies, dial 911. Now available from your iPhone and Android! Please provide this summary of care documentation to your next provider. Your primary care clinician is listed as Kulwinder Alonzo. If you have any questions after today's visit, please call [phone].

## 2018-07-02 NOTE — PROGRESS NOTES
1. Have you been to the ER, urgent care clinic since your last visit? Hospitalized since your last visit? No    2. Have you seen or consulted any other health care providers outside of the [address] [place] [address] since your last visit? Include any pap smears or colon screening.  No

## 2018-07-05 NOTE — PROGRESS NOTES
Spoke with patient. Patient aware of results and states understanding at this time. Patient states that he does not see a nephrologist.  We can send to Dr. Freddie Loaiza. What diagnosis would you like for the referral?    Patient was also very OOB on the phone and sounded winded and wheezing. Patient scheduling an appt to be seen.

## 2018-07-06 PROBLEM — E11.21 TYPE 2 DIABETES WITH NEPHROPATHY (HCC): Status: ACTIVE | Noted: 2018-01-01

## 2018-07-06 NOTE — MR AVS SNAPSHOT
[address] [phone] Patient: Dallas Woodward MRN: [id number] :10/31/1933 Visit Information Date & Time Provider Department Dept. Phone Encounter #  
 2018 10:00 AM Nico Overton  [address] [facility] [phone] [id number] Your Appointments 2018  1:00 PM  
Follow Up with Corie Cheng MD  
[facility] [address] [facility] ([address]) Appt Note: 3 month f/u  
 [facility]  
[phone] [facility] Upcoming Health Maintenance Date Due  
 EYE EXAM RETINAL OR DILATED Q1 10[birth year]31[birth year]1943 GLAUCOMA SCREENING Q2Y 10/31/1998 Influenza Age 5 to Adult 2018 FOOT EXAM Q1 2018 HEMOGLOBIN A1C Q6M 2018 MICROALBUMIN Q1 2019 MEDICARE YEARLY EXAM 2019 LIPID PANEL Q1 2019 DTaP/Tdap/Td series (2 - Td) 2/3/2027 Allergies as of 2018  Review Complete On: 2018 By: Corie Cheng MD  
  
 Severity Noted Reaction Type Reactions Aspirin Medium 2012   Side Effect Other (comments) \"makes stomach raw\" Current Immunizations  Never Reviewed Name Date Zoster Vaccine, Live 2/10/2017 Not reviewed this visit You Were Diagnosed With   
  
 Codes Comments Wheezing     ICD-10-CM: R06.2 ICD-9-CM: 786.07 Vitals BP Pulse Temp Resp Height(growth percentile) Weight(growth percentile) 117/71 (BP 1 Location: Left arm, BP Patient Position: Sitting) 82 97.4 °F (36.3 °C) (Oral) 18 5' 10\" (1.778 m) 135 lb 12.8 oz (61.6 kg) SpO2 BMI Smoking Status 97% 19.49 kg/m2 Former Smoker Vitals History BMI and BSA Data Body Mass Index Body Surface Area  
 19.49 kg/m 2 1.74 m 2 Preferred Pharmacy Pharmacy Name Phone [address] [facility] [address] [facility] [phone] Your Updated Medication List  
  
   
This list is accurate as of 7/6/18 10:53 AM.  Always use your most recent med list.  
  
  
  
  
 albuterol 90 mcg/actuation inhaler Commonly known as:  PROVENTIL HFA, VENTOLIN HFA, PROAIR HFA Take 2 Puffs by inhalation every four (4) hours as needed for Wheezing for up to 30 days. Indications: Bronchospastic Pulmonary Disease  
  
 ammonium lactate 12 % lotion Commonly known as:  LAC-HYDRIN Apply 5 g to affected area two (2) times a day for 90 days. rub in to affected area well  
  
 calcium 500 mg Tab Take  by mouth. cholecalciferol (vitamin d3) 400 unit Cap Commonly known as:  VITAMIN D3 Take 400 Units by mouth daily. hydrocortisone valerate 0.2 % topical cream  
Commonly known as:  [employer] Apply  to affected area two (2) times a day. use thin layer Lancets Misc Use to check blood sugars on monitor  
  
 magnesium 250 mg Tab Take 1 Tab by mouth daily. metFORMIN 500 mg tablet Commonly known as:  GLUCOPHAGE Take 1 Tab by mouth two (2) times daily (with meals). OTHER  
  
 predniSONE 10 mg tablet Commonly known as:  Alinda Monalisa Take 5 today then 4 Tuesday and 3 Wednesday and 2 Thursday and one Friday  
  
 simvastatin 40 mg tablet Commonly known as:  ZOCOR  
TAKE 1 TABLET EVERY NIGHT Introducing [facility]! [facility] introduces [employer] patient portal. Now you can access parts of your medical record, email your doctor's office, and request medication refills online. 1. In your internet browser, go to [email or web address] 2. Click on the First Time User? Click Here link in the Sign In box. You will see the New Member Sign Up page. 3. Enter your [employer] Access Code exactly as it appears below.  You will not need to use this code after youve completed the sign-up process. If you do not sign up before the expiration date, you must request a new code. · [employer] Access Code: [email or web address] Expires: 7/29/2018  4:16 PM 
 
4. Enter the last four digits of your Social Security Number (xxxx) and Date of Birth (mm/dd/yyyy) as indicated and click Submit. You will be taken to the next sign-up page. 5. Create a [employer] ID. This will be your [employer] login ID and cannot be changed, so think of one that is secure and easy to remember. 6. Create a [employer] password. You can change your password at any time. 7. Enter your Password Reset Question and Answer. This can be used at a later time if you forget your password. 8. Enter your e-mail address. You will receive e-mail notification when new information is available in [address] 9. Click Sign Up. You can now view and download portions of your medical record. 10. Click the Download Summary menu link to download a portable copy of your medical information. If you have questions, please visit the Frequently Asked Questions section of the [employer] website. Remember, [employer] is NOT to be used for urgent needs. For medical emergencies, dial 911. Now available from your iPhone and Android! Please provide this summary of care documentation to your next provider. Your primary care clinician is listed as Mk García. If you have any questions after today's visit, please call [phone].

## 2018-07-06 NOTE — PROGRESS NOTES
1. Have you been to the ER, urgent care clinic since your last visit? Hospitalized since your last visit? No    2. Have you seen or consulted any other health care providers outside of the [address] [place] [address] since your last visit? Include any pap smears or colon screening.  No

## 2018-07-06 NOTE — PROGRESS NOTES
Subjective:     Chief Complaint   Patient presents with    Wheezing       Tamara Watts is a 80 y.o. male who presents today with c/o cough, wheezing, and no appetite that started about one month ago. Says the cough produces white mucus and is worse at night. No fever or chills. No chest pain or SOB. He was seen for this 4 days ago by his PCP who ordered him some prednisone and an Albuterol inhaler. Pt has not started these medications yet because they were accidentally sent to his mail-order pharmacy. He does have a smoking history but quit in 1950. Has lost about 30 pounds in 3.5 years. Had a CT of the chest done with contrast  in 2013 and a 1 cm nodule was found on the R lung apex. It was recommended that he have a follow-up CT scan in 4-6 months but there is no other CT scan in his chart and he denies receiving healthcare outside of [address] [place] [address]. Would like to do a repeat CT scan now to check on lung nodule but at this point he has some CKD. Last creatinine was 1.93 on 7/2/18. He has been referred to the nephrologist but has not seen her yet. CT with contrast may be contraindicated due to poor kidney function.        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    History of cholecystectomy Z90.49    Actinic keratoses L57.0    Dry skin dermatitis L85.3    Controlled type 2 diabetes mellitus without complication, without long-term current use of insulin (HCC) E11.9    Hypomagnesemia E83.42    Hypovitaminosis D E55.9    Mitral regurgitation and aortic stenosis I08.0    Type 2 diabetes with nephropathy ([address] E11.21       Past Medical History:   Diagnosis Date    Arthritis     hips    CAD (coronary artery disease) 2008    quintuple bypass-Savage    Calculus of kidney     Cancer ([address]     skin cancers (removed)    Diabetes ([address] 1990's    Type II    GERD (gastroesophageal reflux disease)     Hypercholesterolemia     Other ill-defined conditions(799.89)     elevated cholesterol         Current Outpatient Prescriptions:     hydrocortisone valerate (WESTCORT) 0.2 % topical cream, Apply  to affected area two (2) times a day. use thin layer, Disp: 15 g, Rfl: 1    predniSONE (DELTASONE) 10 mg tablet, Take 5 today then 4 Tuesday and 3 Wednesday and 2 Thursday and one Friday, Disp: 15 Tab, Rfl: 0    albuterol (PROVENTIL HFA, VENTOLIN HFA, PROAIR HFA) 90 mcg/actuation inhaler, Take 2 Puffs by inhalation every four (4) hours as needed for Wheezing for up to 30 days. Indications: Bronchospastic Pulmonary Disease, Disp: 1 Inhaler, Rfl: 5    simvastatin (ZOCOR) 40 mg tablet, TAKE 1 TABLET EVERY NIGHT, Disp: 90 Tab, Rfl: 1    metFORMIN (GLUCOPHAGE) 500 mg tablet, Take 1 Tab by mouth two (2) times daily (with meals). , Disp: 180 Tab, Rfl: 1    ammonium lactate (LAC-HYDRIN) 12 % lotion, Apply 5 g to affected area two (2) times a day for 90 days. rub in to affected area well, Disp: 450 g, Rfl: 3    cholecalciferol, vitamin d3, (VITAMIN D3) 400 unit cap, Take 400 Units by mouth daily. , Disp: 90 Cap, Rfl: 1    magnesium 250 mg tab, Take 1 Tab by mouth daily. , Disp: 90 Tab, Rfl: 1    OTHER, , Disp: , Rfl:     Lancets misc, Use to check blood sugars on monitor, Disp: 1 Package, Rfl: 2    calcium 500 mg Tab, Take  by mouth., Disp: , Rfl:     Allergies   Allergen Reactions    Aspirin Other (comments)     \"makes stomach raw\"       Past Surgical History:   Procedure Laterality Date    CARDIAC SURG PROCEDURE UNLIST  2008    quintuple bypass-Wofgang    COLONOSCOPY      HX CHOLECYSTECTOMY      HX ORTHOPAEDIC  1996 & 2009    hip replacement- bilateral    HX UROLOGICAL  1965    kidney stone extraction       History   Smoking Status    Former Smoker   Smokeless Tobacco    Never Used     Comment: quit smoking 1954       Social History     Social History    Marital status:      Spouse name: N/A    Number of children: N/A    Years of education: N/A     Social History Main Topics    Smoking status: Former Smoker    Smokeless tobacco: Never Used      Comment: quit smoking 1954    Alcohol use No    Drug use: No    Sexual activity: Not Asked     Other Topics Concern    None     Social History Narrative       Family History   Problem Relation Age of Onset    Diabetes Mother     Heart Disease Father     Heart Disease Brother        ROS:  Gen: denies fever, chills, or fatigue  HEENT: + mild nasal congestion and runny nose. No headaches or sore throat  Resp: + cough and wheezing. No dyspnea  CV: denies chest pain, pressure, or palpitations  Extremeties: denies edema, pallor, or cyanosis  Neuro: denies dizziness  Skin: denies rashes or new lesions     Objective:     Visit Vitals    /71 (BP 1 Location: Left arm, BP Patient Position: Sitting)    Pulse 82    Temp 97.4 °F (36.3 °C) (Oral)    Resp 18    Ht 5' 10\" (1.778 m)    Wt 135 lb 12.8 oz (61.6 kg)    SpO2 97%    BMI 19.49 kg/m2     Body mass index is 19.49 kg/(m^2). General: Alert and oriented. No acute distress. Very thin. HEENT :  Ears:TMs normal. Canals clear. Eyes: Sclera white, conjunctiva clear. PERRLA. Extra ocular movements intact. Nose: Patent. Nasal mucosa pink, non-edematous, and without drainage. Mouth: Pharynx non-erythematous, without exudate   Neck: Supple with FROM. No lymphadenopathy  Lungs: Breathing even and unlabored. All lobes clear to auscultation bilaterally   Heart :RRR, S1 and S2 normal intensity, no extra heart sounds  Extremities: Non-edematous  Neuro: Cranial nerves grossly normal.  Mental status: Cognition, concentration, memory, and speech intact. Skin: Warm, dry, and intact. No lesions or discoloration.     Results for orders placed or performed in visit on 62/17/09   METABOLIC PANEL, COMPREHENSIVE   Result Value Ref Range    Glucose 142 (H) 65 - 99 mg/dL    BUN 32 (H) 8 - 27 mg/dL    Creatinine 1.93 (H) 0.76 - 1.27 mg/dL    GFR est non-AA 31 (L) >59 mL/min/1.73    GFR est AA 36 (L) >59 mL/min/1.73    BUN/Creatinine ratio 17 10 - 24    Sodium 138 134 - 144 mmol/L    Potassium 3.9 3.5 - 5.2 mmol/L    Chloride 92 (L) 96 - 106 mmol/L    CO2 25 20 - 29 mmol/L    Calcium 12.7 (H) 8.6 - 10.2 mg/dL    Protein, total 6.9 6.0 - 8.5 g/dL    Albumin 4.5 3.5 - 4.7 g/dL    GLOBULIN, TOTAL 2.4 1.5 - 4.5 g/dL    A-G Ratio 1.9 1.2 - 2.2    Bilirubin, total 0.6 0.0 - 1.2 mg/dL    Alk. phosphatase 81 39 - 117 IU/L    AST (SGOT) 18 0 - 40 IU/L    ALT (SGPT) 9 0 - 44 IU/L   TSH 3RD GENERATION   Result Value Ref Range    TSH 3.340 0.450 - 4.500 uIU/mL   CBC WITH AUTOMATED DIFF   Result Value Ref Range    WBC 5.1 3.4 - 10.8 x10E3/uL    RBC 4.25 4.14 - 5.80 x10E6/uL    HGB 12.8 (L) 13.0 - 17.7 g/dL    HCT 37.5 37.5 - 51.0 %    MCV 88 79 - 97 fL    MCH 30.1 26.6 - 33.0 pg    MCHC 34.1 31.5 - 35.7 g/dL    RDW 13.4 12.3 - 15.4 %    PLATELET 445 720 - 530 x10E3/uL    NEUTROPHILS 67 Not Estab. %    Lymphocytes 18 Not Estab. %    MONOCYTES 14 Not Estab. %    EOSINOPHILS 1 Not Estab. %    BASOPHILS 0 Not Estab. %    ABS. NEUTROPHILS 3.4 1.4 - 7.0 x10E3/uL    Abs Lymphocytes 0.9 0.7 - 3.1 x10E3/uL    ABS. MONOCYTES 0.7 0.1 - 0.9 x10E3/uL    ABS. EOSINOPHILS 0.1 0.0 - 0.4 x10E3/uL    ABS. BASOPHILS 0.0 0.0 - 0.2 x10E3/uL    IMMATURE GRANULOCYTES 0 Not Estab. %    ABS. IMM. GRANS. 0.0 0.0 - 0.1 x10E3/uL   CKD REPORT   Result Value Ref Range    Interpretation Note        No results found for this visit on 07/06/18. Assessment/ Plan:     1. Wheezing  Prednisone and Albuterol called into pt's local pharmacy, [facility]. F/U 1 week    2. Abnormal weight loss/ poor appetite  Suspect lung cancer given pt's nodule found on CT scan 5 years ago. Would like to repeat CT scan of the chest with contrast but first want pt to see nephrologist to discuss risks/ benefits.   Will contact Dr Ede Huffman and ask her to please advise. Verbal and written instructions (see AVS) provided.  Patient expresses understanding of diagnosis and treatment plan. Health Maintenance Due   Topic Date Due    EYE EXAM RETINAL OR DILATED Q1  10[birth year]31/1943    GLAUCOMA SCREENING Q2Y  10/31/1998         Follow-up Disposition: Not on File      Georgia Bean NP

## 2018-07-10 NOTE — TELEPHONE ENCOUNTER
Returned daughter's call and explained to her that I wanted to get Dr Jama Covarrubias input before ordering the CT scan because I do not want to cause any further damage to pt's kidneys. She says that she is about to have back surgery and will not be able to accompany her dad to the nephrology appt and she does not want him to see Dr Kathrynn Merlin alone because he will get confused and not remember what was said. She asked me if I could talk to Dr Kathrynn Merlin over the phone without the patient going in for an appt. I told her I would call the doctor tomorrow and get back to her. May have to order CT scan without contrast. She also tells me patient has not had a good appetite in a very long time so I called in an appetite stimulant, Megace to [facility].

## 2018-07-10 NOTE — TELEPHONE ENCOUNTER
Pt daughter Purnima Doss called (on disclosure) and is wanting a call back re: pt last appointment 7/6/18. She states he seems unsure of what he was told and she is stating she's not sure he advised of what was going on. She is stating he has also lost 15-20 lbs in the last 6 weeks.  She can be reached at [phone]

## 2018-07-10 NOTE — TELEPHONE ENCOUNTER
I spoke with Mr. Kush Ceballos daughter and she doesn't think he clearly understood what was going on at office visit.   She wants to know when he can get the CT and if there is anything he can be given to help him with poor appetite

## 2018-07-11 NOTE — TELEPHONE ENCOUNTER
I spoke to pt's daughter on the phone about my conversation with the nephrologist Dr Andrew Morse. She advised not to have contrast with the CT scan due to the risk of worsening kidney function. Will order the CT scan withOUT contrast to r/o lung tumor. Pt's daughter verbalized understanding and I told her I would call her with the results since her father has some dementia.

## 2018-07-16 NOTE — PROGRESS NOTES
1. Have you been to the ER, urgent care clinic since your last visit? Hospitalized since your last visit? No    2. Have you seen or consulted any other health care providers outside of the [address] [place] [address] since your last visit? Include any pap smears or colon screening.  No

## 2018-07-16 NOTE — PROGRESS NOTES
Subjective:     Chief Complaint   Patient presents with    Abnormal CT Scan     discuss results       Leanne Fernandez is a 80 y.o. male, accompanied by his daughter Tia Oshea, who presents today to discuss his recent CT scan of the chest. A 7x7 cm mass was found on the R hilar region with surrounding enlarged lymph nodes and nodules. Suspicious of bronchogenic carcinoma. Pt has had a cough and wheezing with poor appetite and weight loss. He has lost 20 pounds over the past year and has lost 4 pounds in the past 10 days. Was prescribed liquid Megace to stimulate his appetite but he says it is hard to swallow. It is easier for him to eat thickened liquids like pudding or applesauce. Says it feels like food sometimes gets stuck in his throat. 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    History of cholecystectomy Z90.49    Actinic keratoses L57.0    Dry skin dermatitis L85.3    Controlled type 2 diabetes mellitus without complication, without long-term current use of insulin (HCC) E11.9    Hypomagnesemia E83.42    Hypovitaminosis D E55.9    Mitral regurgitation and aortic stenosis I08.0    Type 2 diabetes with nephropathy ([facility] E11.21       Past Medical History:   Diagnosis Date    Arthritis     hips    CAD (coronary artery disease) 2008    quintuple bypass-Savage    Calculus of kidney     Cancer ([facility]     skin cancers (removed)    Diabetes ([facility] 1990's    Type II    GERD (gastroesophageal reflux disease)     Hypercholesterolemia     Other ill-defined conditions(799.89)     elevated cholesterol         Current Outpatient Prescriptions:     megestrol (MEGACE) 400 mg/10 mL (10 mL) suspension, Take 10 mL by mouth daily. , Disp: 150 mL, Rfl: 1    albuterol (PROVENTIL HFA, VENTOLIN HFA, PROAIR HFA) 90 mcg/actuation inhaler, Take 2 Puffs by inhalation every four (4) hours as needed for Wheezing for up to 30 days. Indications: Bronchospastic Pulmonary Disease, Disp: 1 Inhaler, Rfl: 5    predniSONE (DELTASONE) 10 mg tablet, Take 5 today then 4 Tuesday and 3 Wednesday and 2 Thursday and one Friday, Disp: 15 Tab, Rfl: 0    hydrocortisone valerate (WESTCORT) 0.2 % topical cream, Apply  to affected area two (2) times a day. use thin layer, Disp: 15 g, Rfl: 1    simvastatin (ZOCOR) 40 mg tablet, TAKE 1 TABLET EVERY NIGHT, Disp: 90 Tab, Rfl: 1    metFORMIN (GLUCOPHAGE) 500 mg tablet, Take 1 Tab by mouth two (2) times daily (with meals). , Disp: 180 Tab, Rfl: 1    ammonium lactate (LAC-HYDRIN) 12 % lotion, Apply 5 g to affected area two (2) times a day for 90 days. rub in to affected area well, Disp: 450 g, Rfl: 3    cholecalciferol, vitamin d3, (VITAMIN D3) 400 unit cap, Take 400 Units by mouth daily. , Disp: 90 Cap, Rfl: 1    magnesium 250 mg tab, Take 1 Tab by mouth daily. , Disp: 90 Tab, Rfl: 1    OTHER, , Disp: , Rfl:     Lancets misc, Use to check blood sugars on monitor, Disp: 1 Package, Rfl: 2    calcium 500 mg Tab, Take  by mouth., Disp: , Rfl:     Allergies   Allergen Reactions    Aspirin Other (comments)     \"makes stomach raw\"       Past Surgical History:   Procedure Laterality Date    CARDIAC SURG PROCEDURE UNLIST  2008    quintuple bypass-Wofgang    COLONOSCOPY      HX CHOLECYSTECTOMY      HX ORTHOPAEDIC  1996 & 2009    hip replacement- bilateral    HX UROLOGICAL  1965    kidney stone extraction       History   Smoking Status    Former Smoker   Smokeless Tobacco    Never Used     Comment: quit smoking 1954       Social History     Social History    Marital status:      Spouse name: N/A    Number of children: N/A    Years of education: N/A     Social History Main Topics    Smoking status: Former Smoker    Smokeless tobacco: Never Used      Comment: quit smoking 1954    Alcohol use No    Drug use: No    Sexual activity: Not Asked     Other Topics Concern  None     Social History Narrative       Family History   Problem Relation Age of Onset    Diabetes Mother     Heart Disease Father     Heart Disease Brother        ROS:  Gen: + weight loss denies fever, chills, or fatigue  Resp: + cough and wheezing. No dyspnea or pleuritic chest pain  CV: denies chest pain, pressure, or palpitations  Extremeties: denies edema, pallor, or cyanosis  GI[de-identified] + dysphagia No abdominal pain, nausea, vomiting, or diarrhea  Neuro: denies dizziness  Skin: denies rashes or new lesions     Objective:     Visit Vitals    /72 (BP 1 Location: Left arm, BP Patient Position: Sitting)    Pulse 96    Temp 97.7 °F (36.5 °C) (Temporal)    Resp 18    Ht 5' 10\" (1.778 m)    Wt 131 lb (59.4 kg)    SpO2 95%    BMI 18.8 kg/m2     Body mass index is 18.8 kg/(m^2). General: Alert and oriented. No acute distress. Very thin  Lungs: Breathing even and unlabored. All lobes clear to auscultation bilaterally   Heart :RRR, S1 and S2 normal intensity, no extra heart sounds  Extremities: Non-edematous  Neuro: Cranial nerves grossly normal.  Skin: Warm, dry, and intact. No lesions or discoloration. No results found for this visit on 07/16/18. Assessment/ Plan:     1. Lung mass  - REFERRAL TO PULMONARY DISEASE- needs bronchoscopy to perform mass biopsy  -PET scan ordered to check for metastasis. -Will refer to oncology pending biopsy results. -May cont albuterol inhaler prn wheezing. -F/U with me after test results completed. 2. Anorexia  Stop liquid Megace given dysphagia  Start Megace 40mg po daily- may crush pill and take with applesauce and pudding. Cont to drink Boost drinks. Notify me if dysphagia worsens and will get barium swallow study. Pt and daughter not interested at this time. F/U prn.     Orders Placed This Encounter    REFERRAL TO PULMONARY DISEASE     Referral Priority:   Routine     Referral Type:   Consultation     Referral Reason:   Specialty Services Required Verbal and written instructions (see AVS) provided.  Patient expresses understanding of diagnosis and treatment plan. Health Maintenance Due   Topic Date Due    EYE EXAM RETINAL OR DILATED Q1  10[birth year]31/1943    GLAUCOMA SCREENING Q2Y  10/31/1998         Follow-up Disposition: Not on File      Georgia Mendez NP

## 2018-07-16 NOTE — MR AVS SNAPSHOT
[address] 67 [phone] Patient: Arnav Hebert MRN: [id number] :10/31/1933 Visit Information Date & Time Provider Department Dept. Phone Encounter #  
 2018  1:00 PM Berenice Encarnacion NP [address] [phone] [id number] Your Appointments 2018  1:00 PM  
Follow Up with Victor Manuel Padgett MD  
[address] ([address]) Appt Note: 3 month f/u  
 [address]  
[phone] [address] Upcoming Health Maintenance Date Due  
 EYE EXAM RETINAL OR DILATED Q1 10/31/1943 GLAUCOMA SCREENING Q2Y 10/31/1998 Influenza Age 5 to Adult 2018 FOOT EXAM Q1 2018 HEMOGLOBIN A1C Q6M 2018 MICROALBUMIN Q1 2019 MEDICARE YEARLY EXAM 2019 LIPID PANEL Q1 2019 DTaP/Tdap/Td series (2 - Td) 2/3/2027 Allergies as of 2018  Review Complete On: 2018 By: Berenice Encarnacion NP Severity Noted Reaction Type Reactions Aspirin Medium 2012   Side Effect Other (comments) \"makes stomach raw\" Current Immunizations  Never Reviewed Name Date Zoster Vaccine, Live 2/10/2017 Not reviewed this visit You Were Diagnosed With   
  
 Codes Comments Lung mass    -  Primary ICD-10-CM: R91.8 ICD-9-CM: 724. 6 Anorexia     ICD-10-CM: R63.0 ICD-9-CM: 465. 0 Vitals BP Pulse Temp Resp Height(growth percentile) 126/72 (BP 1 Location: Left arm, BP Patient Position: Sitting) 96 97.7 °F (36.5 °C) (Temporal) 18 5' 10\" (1.778 m) Weight(growth percentile) SpO2 BMI Smoking Status 131 lb (59.4 kg) 95% 18.8 kg/m2 Former Smoker Vitals History BMI and BSA Data Body Mass Index Body Surface Area  
 18.8 kg/m 2 1.71 m 2 Preferred Pharmacy Pharmacy Name Phone [phone] [facility] [address] [facility] [phone] Your Updated Medication List  
  
   
This list is accurate as of 7/16/18  2:10 PM.  Always use your most recent med list.  
  
  
  
  
 albuterol 90 mcg/actuation inhaler Commonly known as:  PROVENTIL HFA, VENTOLIN HFA, PROAIR HFA Take 2 Puffs by inhalation every four (4) hours as needed for Wheezing for up to 30 days. Indications: Bronchospastic Pulmonary Disease  
  
 ammonium lactate 12 % lotion Commonly known as:  LAC-HYDRIN Apply 5 g to affected area two (2) times a day for 90 days. rub in to affected area well  
  
 calcium 500 mg Tab Take  by mouth. cholecalciferol (vitamin d3) 400 unit Cap Commonly known as:  VITAMIN D3 Take 400 Units by mouth daily. hydrocortisone valerate 0.2 % topical cream  
Commonly known as:  [employer] Apply  to affected area two (2) times a day. use thin layer Lancets Misc Use to check blood sugars on monitor  
  
 magnesium 250 mg Tab Take 1 Tab by mouth daily. megestrol 40 mg tablet Commonly known as:  MEGACE Take 1 Tab by mouth daily. metFORMIN 500 mg tablet Commonly known as:  GLUCOPHAGE Take 1 Tab by mouth two (2) times daily (with meals). OTHER  
  
 predniSONE 10 mg tablet Commonly known as:  Jass Dally Take 5 today then 4 Tuesday and 3 Wednesday and 2 Thursday and one Friday  
  
 simvastatin 40 mg tablet Commonly known as:  ZOCOR  
TAKE 1 TABLET EVERY NIGHT Prescriptions Sent to Pharmacy Refills  
 megestrol (MEGACE) 40 mg tablet 3 Sig: Take 1 Tab by mouth daily. Class: Normal  
 Pharmacy: [facility] [address] [facility] [place] Ph #: [phone] Route: Oral  
  
We Performed the Following REFERRAL TO PULMONARY DISEASE [PHS81 Custom] To-Do List   
 07/17/2018 Imaging:  PET/CT TUMOR IMAGE SKULL THIGH W (INI) Referral Information Referral ID Referred By Referred To  
  
 [id number] SHIRIN You [facility] [address] [facility] [address] Visits Status Start Date End Date 1 Authorized 7/16/18 7/16/19 If your referral has a status of pending review or denied, additional information will be sent to support the outcome of this decision. Introducing [facility]! Anuja Resendiz introduces [employer] patient portal. Now you can access parts of your medical record, email your doctor's office, and request medication refills online. 1. In your internet browser, go to [email or web address] 2. Click on the First Time User? Click Here link in the Sign In box. You will see the New Member Sign Up page. 3. Enter your [employer] Access Code exactly as it appears below. You will not need to use this code after youve completed the sign-up process. If you do not sign up before the expiration date, you must request a new code. · [employer] Access Code: [email or web address] Expires: 7/29/2018  4:16 PM 
 
4. Enter the last four digits of your Social Security Number (xxxx) and Date of Birth (mm/dd/yyyy) as indicated and click Submit. You will be taken to the next sign-up page. 5. Create a [employer] ID. This will be your [employer] login ID and cannot be changed, so think of one that is secure and easy to remember. 6. Create a [employer] password. You can change your password at any time. 7. Enter your Password Reset Question and Answer. This can be used at a later time if you forget your password. 8. Enter your e-mail address. You will receive e-mail notification when new information is available in [address] 9. Click Sign Up. You can now view and download portions of your medical record. 10. Click the Download Summary menu link to download a portable copy of your medical information. If you have questions, please visit the Frequently Asked Questions section of the [employer] website. Remember, [facility] is NOT to be used for urgent needs. For medical emergencies, dial 911. Now available from your iPhone and Android! Please provide this summary of care documentation to your next provider. Your primary care clinician is listed as Jean Claude Leaver. If you have any questions after today's visit, please call [phone].

## 2018-07-18 PROBLEM — M97.02XA PERIPROSTHETIC FRACTURE AROUND INTERNAL PROSTHETIC LEFT HIP JOINT (HCC): Status: ACTIVE | Noted: 2018-01-01

## 2018-07-18 PROBLEM — E83.52 HYPERCALCEMIA OF MALIGNANCY: Status: ACTIVE | Noted: 2018-01-01

## 2018-07-18 PROBLEM — S72.102A: Status: ACTIVE | Noted: 2018-01-01

## 2018-07-18 PROBLEM — R13.19 ESOPHAGEAL DYSPHAGIA: Status: ACTIVE | Noted: 2018-01-01

## 2018-07-18 PROBLEM — R91.8 MASS OF RIGHT LUNG: Status: ACTIVE | Noted: 2018-01-01

## 2018-07-18 PROBLEM — I08.0 MITRAL REGURGITATION AND AORTIC STENOSIS: Chronic | Status: ACTIVE | Noted: 2017-08-18

## 2018-07-18 PROBLEM — N17.9 AKI (ACUTE KIDNEY INJURY) (HCC): Status: ACTIVE | Noted: 2018-01-01

## 2018-07-18 NOTE — PROGRESS NOTES
Called and spoke to Julianna in [facility] medical records & she is mailing a ct disc to pts Pulmonary doctor Dr. Deepak Voss

## 2018-07-18 NOTE — TELEPHONE ENCOUNTER
Spoke to pt's daughter and told her I will write a prescription for Ativan 0.5mg po x 1 to be taken 30 minutes prior to PET scan. Will leave script up at the  for her to  at her convenience.

## 2018-07-18 NOTE — TELEPHONE ENCOUNTER
Pt scheduled for scan next Friday 7/27 and pt is claustrophobic and daughter is asking for somehting for anxiety before the test

## 2018-08-13 NOTE — TELEPHONE ENCOUNTER
Pt never picked up rx d/t being in hospital. [employer] has been destroyed if needed will call in.

## 2018-08-16 NOTE — TELEPHONE ENCOUNTER
Called the patients daughter to inform her that the patient does not have an upcoming appointment with us. No answer, left a voicemail.

## 2018-08-21 PROBLEM — R53.1 WEAKNESS GENERALIZED: Status: ACTIVE | Noted: 2018-01-01

## 2018-08-21 PROBLEM — R13.10 DYSPHAGIA: Status: ACTIVE | Noted: 2018-01-01

## 2018-08-21 PROBLEM — C83.39 DIFFUSE LARGE B-CELL LYMPHOMA OF SOLID ORGAN EXCLUDING SPLEEN (HCC): Status: ACTIVE | Noted: 2018-01-01

## 2018-08-21 NOTE — PROGRESS NOTES
[facility] [place] [address] [address], [address]  W: [phone]  F: [phone]    Reason for Visit:   Florence Adorno is a 80 y.o. male who is seen in consultation at the request of Dr. Bill Ledesma for evaluation of lung mass    Treatment History:   Biopsy via bronch and mediastinoscopy    History of Present Illness:     Pt seen today at [facility] clinic consult for right lung mass with path pending. 2 bronch biopsies negative. Mediastinoscopy path prelim lymphoma. CT chest without 7/18: There is a large soft tissue mass centered in the right hilar and subcarinal  region measuring 7 x 7 cm. The mass surrounds the proximal aspect of the left  mainstem bronchus and surrounds entire right mainstem bronchus, narrowing it  slightly. Additional enlarged mediastinal lymph nodes are noted. There is  partially calcified lymph node in the subcarinal region measuring 6 cm.      Small right-sided pleural effusion multiple less than 5 mm nodules are seen in  the right lower lobe. Chronic right apical pleural thickening noted. No evidence  of pneumothorax. No left-sided lung nodules or pleural fluid. Bone scan negative at [facility]. Bronch path 7/18 is negative in 2 specimens. At [facility], could not do transcutaneous biopsy and CT surgery consulted for mediastinoscopy. Pt apparently had med but path is pending. ? Lymphoma. Pt is here from a [facility] and [facility] [place]. Multiple medical problems including CAD/ COPD/dementia/ hip fracture. Daughter and family is here today. GD is a NP. Pt is in a w/c on 02. C/o SOB. C/o not able to eat/ swallow. Very weak. Pt has elevated creat. Pt and family do not know dx and are here for path results and discussion.      Past Medical History:   Diagnosis Date    Arthritis     hips    CAD (coronary artery disease) 2008    quintuple bypass-[place]    Calculus of kidney     Cancer ([facility] [address]     skin cancers (removed)    Diabetes ([facility] [address] 1990's    Type II    GERD (gastroesophageal reflux disease)     Hypercholesterolemia     Other ill-defined conditions(799.89)     elevated cholesterol      Past Surgical History:   Procedure Laterality Date    CARDIAC SURG PROCEDURE UNLIST  2008    quintuple bypass-[place]    COLONOSCOPY      HX CHOLECYSTECTOMY      HX ORTHOPAEDIC  1996 & 2009    hip replacement- bilateral    HX UROLOGICAL  1965    kidney stone extraction      Social History   Substance Use Topics    Smoking status: Former Smoker    Smokeless tobacco: Never Used      Comment: quit smoking 1954    Alcohol use No      Family History   Problem Relation Age of Onset    Diabetes Mother     Heart Disease Father     Heart Disease Brother      Current Outpatient Prescriptions   Medication Sig    acetaminophen (TYLENOL) 325 mg tablet Take 650 mg by mouth every six (6) hours as needed for Pain.  magnesium hydroxide (GRACIA MILK OF MAGNESIA) 400 mg/5 mL suspension Take 30 mL by mouth daily as needed for Constipation.  mineral oil (FLEET) enema Insert  into rectum daily as needed for Constipation.  bisacodyl (DULCOLAX, BISACODYL,) 10 mg suppository Insert 10 mg into rectum daily.  albuterol sulfate (PROVENTIL;VENTOLIN) 2.5 mg/0.5 mL nebu nebulizer solution by Nebulization route every four (4) hours as needed for Wheezing.  Polyethylene Glycol 3350 powd Take  by mouth daily.  folic acid (FOLVITE) 1 mg tablet Take  by mouth daily.  carvedilol (COREG) 3.125 mg tablet Take  by mouth two (2) times daily (with meals).  apixaban (ELIQUIS) 2.5 mg tablet Take 2.5 mg by mouth three (3) times daily.  oxyCODONE-acetaminophen (PERCOCET) 5-325 mg per tablet Take  by mouth every four (4) hours as needed for Pain.  ipratropium (ATROVENT) 0.02 % soln 0.5 mg by Nebulization route every six (6) hours as needed.     diphenhydrAMINE 12.5 mg/5 mL liqd 40 mL, lidocaine 2 % soln 40 mL, alum-mag hydroxide-simeth 869-919-44 mg/5 mL susp 40 mL Take  by mouth every eight (8) hours.  dextrose 40% (GLUTOSE 15) 40 % oral gel Take 1 Tube by mouth as needed for Hypoglycemia.  glucagon (GLUCAGEN HYPOKIT) 1 mg injection 1 mg by IntraVENous route as needed for Hypoglycemia.  dextrose (D50W) 50% solution 25 g by IntraVENous route as needed.  megestrol (MEGACE) 40 mg tablet Take 1 Tab by mouth daily. (Patient taking differently: Take 400 mg by mouth daily. Indications: Patient taking 400mg/day by oral suspension (40mg/1mL), taking 10mL)    hydrocortisone valerate (WESTCORT) 0.2 % topical cream Apply  to affected area two (2) times a day. use thin layer    metFORMIN (GLUCOPHAGE) 500 mg tablet Take 1 Tab by mouth two (2) times daily (with meals).  ammonium lactate (LAC-HYDRIN) 12 % lotion Apply 5 g to affected area two (2) times a day for 90 days. rub in to affected area well    cholecalciferol, vitamin d3, (VITAMIN D3) 400 unit cap Take 400 Units by mouth daily.  magnesium 250 mg tab Take 1 Tab by mouth daily.  OTHER     Lancets misc Use to check blood sugars on monitor    calcium 500 mg Tab Take  by mouth. No current facility-administered medications for this visit. Allergies   Allergen Reactions    Aspirin Other (comments)     \"makes stomach raw\"        Review of Systems: A complete review of systems was obtained, negative except as described above.     Physical Exam:     Visit Vitals    /87    Pulse (!) 57    Temp 97.5 °F (36.4 °C) (Oral)    Resp 18    Ht 6' 1\" (1.854 m)    SpO2 97%     ECOG PS: 2   General: thin pale WM in w/c on 02  Eyes:  anicteric sclerae  HENT: Atraumatic, OP clear  Neck: Supple  Respiratory: CTAB, normal respiratory effort  CV: Normal rate, regular rhythm, no murmurs, no peripheral edema  GI: Soft, nontender  MS weak and in w/c  Skin: warm, dry  Psych: some memory issues but appropriately conversant    Results:     Lab Results   Component Value Date/Time    WBC 5.2 08/04/2018 03:30 AM    HGB 11.2 (L) 08/04/2018 03:30 AM    HCT 32.7 (L) 08/04/2018 03:30 AM    PLATELET 262 61/45/6756 03:30 AM    MCV 90.8 08/04/2018 03:30 AM    ABS. NEUTROPHILS 3.9 08/04/2018 03:30 AM    Hemoglobin (POC) 14.3 01/06/2012 06:39 AM    Hematocrit (POC) 42 01/06/2012 06:39 AM     Lab Results   Component Value Date/Time    Sodium 135 (L) 08/04/2018 03:30 AM    Potassium 4.0 08/04/2018 03:30 AM    Chloride 97 08/04/2018 03:30 AM    CO2 20 (L) 08/04/2018 03:30 AM    Glucose 93 08/04/2018 03:30 AM    BUN 35 (H) 08/04/2018 03:30 AM    Creatinine 2.10 (H) 08/04/2018 03:30 AM    GFR est AA 37 (L) 08/04/2018 03:30 AM    GFR est non-AA 30 (L) 08/04/2018 03:30 AM    Calcium 8.9 08/04/2018 03:30 AM    Sodium (POC) 141 01/06/2012 06:39 AM    Potassium (POC) 4.2 01/06/2012 06:39 AM    Chloride (POC) 107 01/06/2012 06:39 AM    Glucose (POC) 121 (H) 01/13/2012 12:18 PM    Glucose  (A) 01/12/2015 05:25 PM    BUN (POC) 23 (H) 01/06/2012 06:39 AM    Creatinine (POC) 1.5 (H) 01/06/2012 06:39 AM    Calcium, ionized (POC) 1.21 01/06/2012 06:39 AM     Lab Results   Component Value Date/Time    Bilirubin, total 2.1 (H) 08/04/2018 03:30 AM    ALT (SGPT) 14 08/04/2018 03:30 AM    AST (SGOT) 24 08/04/2018 03:30 AM    Alk. phosphatase 96 08/04/2018 03:30 AM    Protein, total 6.6 08/04/2018 03:30 AM    Albumin 2.6 (L) 08/04/2018 03:30 AM    Globulin 4.0 08/04/2018 03:30 AM         Records reviewed and summarized above. Pathology report(s) reviewed above. Radiology report(s) reviewed above. Assessment/PLAN:     1) large 7 cm right hilar lung mass prelim Lymphoma path pending. Records reviewed. [place] [facility] [place] [facility] seen [facility] also. At time of pt's visit today, no path back. We discussed possible lymphoma dx without path. Pt has clinically significantly declined over past weeks. Pt is having hard time eating / swallowing now and cannot take pills. Discussed this is likely due to cancer.    Pending path report, chemo would likely be best option as this seems to be an aggressive cancer. However, pt has poor performance status and would not likely tolerate chemo now. We discussed feeding tube since pt cannot eat/ swallow, and pt / family definitely do not want this. Despite the fact that we do not have path report, given advanced and aggressive cancer, pt and family think they want supportive care. We discussed hospice care at facility today. Pt and family want referral and ordered. Will call daughter once we get path report, hopefully today. Pt given IVF x 1 and decadron 10 mg IV for palliation. Labs drawn also per pt/family request.     2) inability to swallow/ eat likely due to cancer. Change meds to liquids. No feeding tube. 3) DM/HTN/ arthritis. Per PCP. 4) family support good. Pt lives in a facility. Consult hospice. Call if questions  F/u here prn. I appreciate the opportunity to participate in Mr. Ying Jenkins [facility]. ADDENDUM:  Post pt's visit today, received path report. 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 Called daughter per pt/ family request today and reviewed path with her  Discussed aggressive but treatable NHL. Reviewed chemo as option but also reviewed pt may not tolerate chemo. Can continue steroids as this may help some with symptoms. Daughter wants to proceed with hospice. Appreciative of call. we will help however we can.        Signed By: Pradip Disla, DO

## 2018-08-21 NOTE — PROGRESS NOTES
Jluis Washington is a 80 y.o. male c/o SOB SAt 97% R 20, mass in R lung. Fracture L hip. Patient lives at [facility]. Patient was seen in [address] [place] [address]. Had Bronscopy.